# Patient Record
Sex: FEMALE | Race: WHITE | Employment: UNEMPLOYED | URBAN - NONMETROPOLITAN AREA
[De-identification: names, ages, dates, MRNs, and addresses within clinical notes are randomized per-mention and may not be internally consistent; named-entity substitution may affect disease eponyms.]

---

## 2019-03-31 ENCOUNTER — HOSPITAL ENCOUNTER (EMERGENCY)
Facility: OTHER | Age: 44
Discharge: HOME | End: 2019-03-31
Attending: INTERNAL MEDICINE | Admitting: INTERNAL MEDICINE
Payer: COMMERCIAL

## 2019-03-31 VITALS
DIASTOLIC BLOOD PRESSURE: 83 MMHG | RESPIRATION RATE: 10 BRPM | HEART RATE: 74 BPM | HEIGHT: 65 IN | TEMPERATURE: 96 F | OXYGEN SATURATION: 96 % | SYSTOLIC BLOOD PRESSURE: 131 MMHG | BODY MASS INDEX: 30.16 KG/M2 | WEIGHT: 181 LBS

## 2019-03-31 DIAGNOSIS — E78.2 MIXED HYPERLIPIDEMIA: ICD-10-CM

## 2019-03-31 DIAGNOSIS — R82.71 BACTERIA IN URINE: ICD-10-CM

## 2019-03-31 DIAGNOSIS — I10 ESSENTIAL HYPERTENSION: ICD-10-CM

## 2019-03-31 LAB
ALBUMIN SERPL-MCNC: 3.6 G/DL (ref 3.5–5.7)
ALBUMIN UR-MCNC: NEGATIVE MG/DL
ALP SERPL-CCNC: 93 U/L (ref 34–104)
ALT SERPL W P-5'-P-CCNC: 9 U/L (ref 7–52)
ANION GAP SERPL CALCULATED.3IONS-SCNC: 11 MMOL/L (ref 3–14)
APPEARANCE UR: CLEAR
AST SERPL W P-5'-P-CCNC: 7 U/L (ref 13–39)
BACTERIA #/AREA URNS HPF: ABNORMAL /HPF
BASOPHILS # BLD AUTO: 0.1 10E9/L (ref 0–0.2)
BASOPHILS NFR BLD AUTO: 1.3 %
BILIRUB SERPL-MCNC: 0.5 MG/DL (ref 0.3–1)
BILIRUB UR QL STRIP: NEGATIVE
BUN SERPL-MCNC: 15 MG/DL (ref 7–25)
CALCIUM SERPL-MCNC: 9.1 MG/DL (ref 8.6–10.3)
CHLORIDE SERPL-SCNC: 97 MMOL/L (ref 98–107)
CHOLEST SERPL-MCNC: 196 MG/DL
CO2 SERPL-SCNC: 21 MMOL/L (ref 21–31)
COLOR UR AUTO: YELLOW
CREAT SERPL-MCNC: 0.79 MG/DL (ref 0.6–1.2)
DIFFERENTIAL METHOD BLD: NORMAL
EOSINOPHIL # BLD AUTO: 0.3 10E9/L (ref 0–0.7)
EOSINOPHIL NFR BLD AUTO: 2.7 %
ERYTHROCYTE [DISTWIDTH] IN BLOOD BY AUTOMATED COUNT: 13.4 % (ref 10–15)
GFR SERPL CREATININE-BSD FRML MDRD: 79 ML/MIN/{1.73_M2}
GLUCOSE SERPL-MCNC: 549 MG/DL (ref 70–105)
GLUCOSE UR STRIP-MCNC: >1000 MG/DL
HBA1C MFR BLD: 13.6 % (ref 4–6)
HCT VFR BLD AUTO: 41.9 % (ref 35–47)
HDLC SERPL-MCNC: 22 MG/DL (ref 23–92)
HGB BLD-MCNC: 14.4 G/DL (ref 11.7–15.7)
HGB UR QL STRIP: ABNORMAL
IMM GRANULOCYTES # BLD: 0.1 10E9/L (ref 0–0.4)
IMM GRANULOCYTES NFR BLD: 0.5 %
KETONES UR STRIP-MCNC: ABNORMAL MG/DL
LDLC SERPL CALC-MCNC: ABNORMAL MG/DL
LEUKOCYTE ESTERASE UR QL STRIP: NEGATIVE
LYMPHOCYTES # BLD AUTO: 3 10E9/L (ref 0.8–5.3)
LYMPHOCYTES NFR BLD AUTO: 28.5 %
MCH RBC QN AUTO: 30.1 PG (ref 26.5–33)
MCHC RBC AUTO-ENTMCNC: 34.4 G/DL (ref 31.5–36.5)
MCV RBC AUTO: 88 FL (ref 78–100)
MONOCYTES # BLD AUTO: 0.6 10E9/L (ref 0–1.3)
MONOCYTES NFR BLD AUTO: 6 %
NEUTROPHILS # BLD AUTO: 6.4 10E9/L (ref 1.6–8.3)
NEUTROPHILS NFR BLD AUTO: 61 %
NITRATE UR QL: NEGATIVE
NON-SQ EPI CELLS #/AREA URNS LPF: ABNORMAL /LPF
NONHDLC SERPL-MCNC: 174 MG/DL
PH UR STRIP: 5 PH (ref 5–9)
PLATELET # BLD AUTO: 409 10E9/L (ref 150–450)
POTASSIUM SERPL-SCNC: 4 MMOL/L (ref 3.5–5.1)
PROT SERPL-MCNC: 6.6 G/DL (ref 6.4–8.9)
RBC # BLD AUTO: 4.79 10E12/L (ref 3.8–5.2)
RBC #/AREA URNS AUTO: ABNORMAL /HPF
SODIUM SERPL-SCNC: 129 MMOL/L (ref 134–144)
SOURCE: ABNORMAL
SP GR UR STRIP: 1.01 (ref 1–1.03)
TRIGL SERPL-MCNC: 715 MG/DL
TROPONIN I SERPL-MCNC: <0.03 UG/L (ref 0–0.03)
UROBILINOGEN UR STRIP-ACNC: 0.2 EU/DL (ref 0.2–1)
WBC # BLD AUTO: 10.4 10E9/L (ref 4–11)
WBC #/AREA URNS AUTO: ABNORMAL /HPF

## 2019-03-31 PROCEDURE — 25000128 H RX IP 250 OP 636: Performed by: INTERNAL MEDICINE

## 2019-03-31 PROCEDURE — 93005 ELECTROCARDIOGRAM TRACING: CPT | Performed by: INTERNAL MEDICINE

## 2019-03-31 PROCEDURE — 83036 HEMOGLOBIN GLYCOSYLATED A1C: CPT | Performed by: INTERNAL MEDICINE

## 2019-03-31 PROCEDURE — 87086 URINE CULTURE/COLONY COUNT: CPT | Performed by: INTERNAL MEDICINE

## 2019-03-31 PROCEDURE — 80061 LIPID PANEL: CPT | Performed by: INTERNAL MEDICINE

## 2019-03-31 PROCEDURE — 99284 EMERGENCY DEPT VISIT MOD MDM: CPT | Mod: 25 | Performed by: INTERNAL MEDICINE

## 2019-03-31 PROCEDURE — 36415 COLL VENOUS BLD VENIPUNCTURE: CPT | Performed by: INTERNAL MEDICINE

## 2019-03-31 PROCEDURE — 80053 COMPREHEN METABOLIC PANEL: CPT | Performed by: INTERNAL MEDICINE

## 2019-03-31 PROCEDURE — 99283 EMERGENCY DEPT VISIT LOW MDM: CPT | Mod: Z6 | Performed by: INTERNAL MEDICINE

## 2019-03-31 PROCEDURE — 93010 ELECTROCARDIOGRAM REPORT: CPT | Performed by: INTERNAL MEDICINE

## 2019-03-31 PROCEDURE — 96365 THER/PROPH/DIAG IV INF INIT: CPT | Performed by: INTERNAL MEDICINE

## 2019-03-31 PROCEDURE — 81001 URINALYSIS AUTO W/SCOPE: CPT | Performed by: INTERNAL MEDICINE

## 2019-03-31 PROCEDURE — 96372 THER/PROPH/DIAG INJ SC/IM: CPT | Performed by: INTERNAL MEDICINE

## 2019-03-31 PROCEDURE — 85025 COMPLETE CBC W/AUTO DIFF WBC: CPT | Performed by: INTERNAL MEDICINE

## 2019-03-31 PROCEDURE — 25000131 ZZH RX MED GY IP 250 OP 636 PS 637: Performed by: INTERNAL MEDICINE

## 2019-03-31 PROCEDURE — 84484 ASSAY OF TROPONIN QUANT: CPT | Performed by: INTERNAL MEDICINE

## 2019-03-31 RX ORDER — DEXTROSE MONOHYDRATE 25 G/50ML
25-50 INJECTION, SOLUTION INTRAVENOUS
Status: DISCONTINUED | OUTPATIENT
Start: 2019-03-31 | End: 2019-03-31 | Stop reason: HOSPADM

## 2019-03-31 RX ORDER — NICOTINE POLACRILEX 4 MG
15-30 LOZENGE BUCCAL
Status: DISCONTINUED | OUTPATIENT
Start: 2019-03-31 | End: 2019-03-31 | Stop reason: HOSPADM

## 2019-03-31 RX ORDER — GLIPIZIDE AND METFORMIN HCL 5; 500 MG/1; MG/1
1 TABLET, FILM COATED ORAL
Qty: 60 TABLET | Refills: 2 | Status: SHIPPED | OUTPATIENT
Start: 2019-03-31 | End: 2019-07-03

## 2019-03-31 RX ORDER — ATORVASTATIN CALCIUM 40 MG/1
40 TABLET, FILM COATED ORAL DAILY
Qty: 30 TABLET | Refills: 2 | Status: SHIPPED | OUTPATIENT
Start: 2019-03-31 | End: 2019-07-03

## 2019-03-31 RX ORDER — LISINOPRIL 20 MG/1
20 TABLET ORAL DAILY
COMMUNITY
End: 2019-04-04

## 2019-03-31 RX ORDER — CEFTRIAXONE SODIUM 1 G/50ML
1 INJECTION, SOLUTION INTRAVENOUS ONCE
Status: COMPLETED | OUTPATIENT
Start: 2019-03-31 | End: 2019-03-31

## 2019-03-31 RX ORDER — PRAVASTATIN SODIUM 10 MG
10 TABLET ORAL DAILY
COMMUNITY
End: 2019-03-31

## 2019-03-31 RX ADMIN — SODIUM CHLORIDE 1000 ML: 9 INJECTION, SOLUTION INTRAVENOUS at 13:15

## 2019-03-31 RX ADMIN — CEFTRIAXONE SODIUM 1 G: 1 INJECTION, SOLUTION INTRAVENOUS at 14:28

## 2019-03-31 RX ADMIN — INSULIN HUMAN 10 UNITS: 100 INJECTION, SOLUTION PARENTERAL at 14:05

## 2019-03-31 ASSESSMENT — ENCOUNTER SYMPTOMS
CONFUSION: 0
ABDOMINAL PAIN: 0
CHILLS: 0
SHORTNESS OF BREATH: 0
BACK PAIN: 0
LIGHT-HEADEDNESS: 1
WOUND: 0
BRUISES/BLEEDS EASILY: 0
ADENOPATHY: 0
CHEST TIGHTNESS: 1
HEMATURIA: 0
DIZZINESS: 1
FEVER: 0

## 2019-03-31 ASSESSMENT — MIFFLIN-ST. JEOR: SCORE: 1471.89

## 2019-03-31 NOTE — ED AVS SNAPSHOT
[facility]  [address]  [place] [address]  Phone:  [phone]  Fax:  [phone]                                    Beatriz Leal   MRN: [id number]    Department:  [facility]   Date of Visit:  3/31/2019           After Visit Summary Signature Page    I have received my discharge instructions, and my questions have been answered. I have discussed any challenges I see with this plan with the nurse or doctor.    ..........................................................................................................................................  Patient/Patient Representative Signature      ..........................................................................................................................................  Patient Representative Print Name and Relationship to Patient    ..................................................               ................................................  Date                                   Time    ..........................................................................................................................................  Reviewed by Signature/Title    ...................................................              ..............................................  Date                                               Time          22EPIC Rev 08/18

## 2019-03-31 NOTE — DISCHARGE INSTRUCTIONS
Stop Pravastatin.     Start Lipitor.     Continue Lisinopril.     Start glipizide and metformin.    Start Levemir or other preferred long-acting insulin in the evening.    Schedule clinic follow-up appointment --- at the end of this week with 1 of the family practice providers to review your home blood sugar levels.    Take Keflex for bladder infection.    Blood sugar checks at home  (check 3 times daily, randomly).   - Check before breakfast, before lunch, before supper and before bedtime and record.   - Bring these with you to your next appointment.      --> Goal AM glucose: .      Results for orders placed or performed during the hospital encounter of 03/31/19   CBC with platelets differential   Result Value Ref Range    WBC 10.4 4.0 - 11.0 10e9/L    RBC Count 4.79 3.8 - 5.2 10e12/L    Hemoglobin 14.4 11.7 - 15.7 g/dL    Hematocrit 41.9 35.0 - 47.0 %    MCV 88 78 - 100 fl    MCH 30.1 26.5 - 33.0 pg    MCHC 34.4 31.5 - 36.5 g/dL    RDW 13.4 10.0 - 15.0 %    Platelet Count 409 150 - 450 10e9/L    Diff Method Automated Method     % Neutrophils 61.0 %    % Lymphocytes 28.5 %    % Monocytes 6.0 %    % Eosinophils 2.7 %    % Basophils 1.3 %    % Immature Granulocytes 0.5 %    Absolute Neutrophil 6.4 1.6 - 8.3 10e9/L    Absolute Lymphocytes 3.0 0.8 - 5.3 10e9/L    Absolute Monocytes 0.6 0.0 - 1.3 10e9/L    Absolute Eosinophils 0.3 0.0 - 0.7 10e9/L    Absolute Basophils 0.1 0.0 - 0.2 10e9/L    Abs Immature Granulocytes 0.1 0 - 0.4 10e9/L   Comprehensive metabolic panel   Result Value Ref Range    Sodium 129 (L) 134 - 144 mmol/L    Potassium 4.0 3.5 - 5.1 mmol/L    Chloride 97 (L) 98 - 107 mmol/L    Carbon Dioxide 21 21 - 31 mmol/L    Anion Gap 11 3 - 14 mmol/L    Glucose 549 (HH) 70 - 105 mg/dL    Urea Nitrogen 15 7 - 25 mg/dL    Creatinine 0.79 0.60 - 1.20 mg/dL    GFR Estimate 79 >60 mL/min/[1.73_m2]    GFR Estimate If Black >90 >60 mL/min/[1.73_m2]    Calcium 9.1 8.6 - 10.3 mg/dL    Bilirubin Total 0.5 0.3 - 1.0  mg/dL    Albumin 3.6 3.5 - 5.7 g/dL    Protein Total 6.6 6.4 - 8.9 g/dL    Alkaline Phosphatase 93 34 - 104 U/L    ALT 9 7 - 52 U/L    AST 7 (L) 13 - 39 U/L   Hemoglobin A1c   Result Value Ref Range    Hemoglobin A1C 13.6 (H) 4.0 - 6.0 %   *UA reflex to Microscopic   Result Value Ref Range    Color Urine Yellow     Appearance Urine Clear     Glucose Urine >1000 (A) NEG^Negative mg/dL    Bilirubin Urine Negative NEG^Negative    Ketones Urine Trace (A) NEG^Negative mg/dL    Specific Gravity Urine 1.010 1.000 - 1.030    Blood Urine Large (A) NEG^Negative    pH Urine 5.0 5.0 - 9.0 pH    Protein Albumin Urine Negative NEG^Negative mg/dL    Urobilinogen Urine 0.2 0.2 - 1.0 EU/dL    Nitrite Urine Negative NEG^Negative    Leukocyte Esterase Urine Negative NEG^Negative    Source Midstream Urine    Urine Microscopic   Result Value Ref Range    WBC Urine 0 - 5 OTO5^0 - 5 /HPF    RBC Urine 25-50 (A) OTO2^O - 2 /HPF    Squamous Epithelial /LPF Urine Few FEW^Few /LPF    Bacteria Urine Moderate (A) NEG^Negative /HPF   Lipid profile   Result Value Ref Range    Cholesterol 196 <200 mg/dL    Triglycerides 715 (H) <150 mg/dL    HDL Cholesterol 22 (L) 23 - 92 mg/dL    LDL Cholesterol Calculated  <100 mg/dL     Cannot estimate LDL when triglyceride exceeds 400 mg/dL    Non HDL Cholesterol 174 (H) <130 mg/dL   Troponin I (now)   Result Value Ref Range    Troponin I ES <0.030 0.000 - 0.034 ug/L

## 2019-03-31 NOTE — ED TRIAGE NOTES
Patient arrived to the ER with complaints of high blood sugar that started today  Patient states it was 465 at home.  Patient is not taking any type of insulin for 2 years.  Patient states that she started feeling bad yesterday and has not improved.  Patient states that she was seeing black spots, dizziness and bilateral armpit to waist burning when she would get up and move.  Patient lives at home and now has insurance to cover her medications.  Patient does not have her own meter and was using a friends to check her blood sugar every other day.

## 2019-03-31 NOTE — ED PROVIDER NOTES
History     Chief Complaint   Patient presents with     Hyperglycemia     HPI  Beatriz Leal is a 43 year old female who presents to the emergency room due to high blood sugar levels.  She checked her blood sugar at home today and it was 465.  She uses a friend's meter as she was checking it once daily.    She had no insurance for quite a while and subsequently had no coverage for diabetic medications.    States that she now some insurance and became worried today because she saw some black spots and got dizzy and lightheaded.    States that normally her blood sugar was as high as 140.  She would check it randomly sometimes once a day.    She needs testing supplies and new diabetic medications.    She has no primary care provider in the clinic, we will get her established with 1 of the family practice providers.  She is due for her diabetic exams as well as annual female exams.    Previously on glyburide 5 mg twice a day, metformin 500 mg twice daily.  She was then 1000 mg twice daily but got diarrhea.  States she was taking Lantus 18 units every evening previously.    --States she has not had any diabetic medications for almost 2 years.    Due to unspecified chest tightness, EKG and troponin were added to the lab work.    She thought her diabetic levels were managed quite well, A1c added on today and came back very high -- greater than 13.    Allergies:  No Known Allergies    Problem List:    Patient Active Problem List    Diagnosis Date Noted     Uncontrolled type 2 diabetes mellitus without complication, with long-term current use of insulin (H) 03/31/2019     Priority: Medium     Bacteria in urine 03/31/2019     Priority: Medium     Mixed hyperlipidemia 03/31/2019     Priority: Medium     Essential hypertension 03/31/2019     Priority: Medium        Past Medical History:    No past medical history on file.    Past Surgical History:    No past surgical history on file.    Family History:    No family history  "on file.    Social History:  Marital Status:    Social History     Tobacco Use     Smoking status: Not on file   Substance Use Topics     Alcohol use: Not on file     Drug use: Not on file        Medications:      atorvastatin (LIPITOR) 40 MG tablet   blood glucose (NO BRAND SPECIFIED) lancets standard   blood glucose (NO BRAND SPECIFIED) test strip   blood glucose monitoring (NO BRAND SPECIFIED) meter device kit   cephALEXin (KEFLEX) 250 MG capsule   glipiZIDE-metFORMIN (METAGLIP) 5-500 MG tablet   insulin detemir (LEVEMIR PEN) 100 UNIT/ML pen   insulin pen needle (31G X 6 MM) 31G X 6 MM miscellaneous   lisinopril (PRINIVIL/ZESTRIL) 20 MG tablet         Review of Systems   Constitutional: Negative for chills and fever.        Previously with significant obesity.  Weight was 327 pounds, now down to approximately 180 pounds.   HENT: Negative for congestion.    Eyes: Positive for visual disturbance (saw some black spots this morning when standing up.).   Respiratory: Positive for chest tightness. Negative for shortness of breath.    Cardiovascular: Negative for chest pain.   Gastrointestinal: Negative for abdominal pain.   Genitourinary: Negative for hematuria.   Musculoskeletal: Negative for back pain.   Skin: Negative for rash and wound.   Neurological: Positive for dizziness and light-headedness. Negative for syncope.   Hematological: Negative for adenopathy. Does not bruise/bleed easily.   Psychiatric/Behavioral: Negative for confusion.       Physical Exam   BP: 127/77  Pulse: 130  Heart Rate: 107  Temp: 96  F (35.6  C)  Resp: 20  Height: 165.1 cm (5' 5\")  Weight: 82.1 kg (181 lb)  SpO2: 95 %      Physical Exam   Constitutional: She appears well-developed and well-nourished. No distress.   HENT:   Head: Normocephalic and atraumatic.   Eyes: Conjunctivae are normal. No scleral icterus.   Neck: Neck supple.   Cardiovascular: Normal rate and regular rhythm.   Pulmonary/Chest: Effort normal.   Abdominal: Soft. There " "is no tenderness.   Musculoskeletal: She exhibits no deformity.   Lymphadenopathy:     She has no cervical adenopathy.   Neurological: She is alert.   Skin: Skin is warm and dry. No rash noted. She is not diaphoretic.   Psychiatric: She has a normal mood and affect.     No Known Allergies  Patient Vitals for the past 24 hrs:   BP Temp Temp src Pulse Resp SpO2 Height Weight   03/31/19 1400 128/78 -- -- 82 13 96 % -- --   03/31/19 1330 152/88 -- -- 93 12 97 % -- --   03/31/19 1320 145/82 -- -- 105 12 96 % -- --   03/31/19 1251 127/77 96  F (35.6  C) Temporal 130 20 95 % 1.651 m (5' 5\") 82.1 kg (181 lb)     Orders Placed This Encounter   Procedures     CBC with platelets differential     Comprehensive metabolic panel     Hemoglobin A1c     *UA reflex to Microscopic     Urine Microscopic     Lipid profile     Troponin I (now)     EKG 12 lead     Peripheral IV catheter     Glucose monitor nursing POCT     Assess for hypoglycemia symptoms     Glucose monitor nursing POCT     Glucose monitor nursing POCT     Notify Provider       ED Course     ED Course as of Mar 31 1504   Sun Mar 31, 2019   1419 Labs ordered and IV and IV fluid bolus ordered.Random blood sugar was greater than 500.  10 units of regular insulin ordered subcutaneous.    Urinalysis collected, abnormalities noted, urine culture ordered and pending.Other diabetic based on labs ordered.EKG and troponin ordered.    1421 Rocephin 1 g IV ordered.  For UTI.    1503 Troponin returned negative.  EKG without acute abnormalities.  Patient was feeling better and wished to discharge home.  Advised close follow-up with family practice clinic at the end of the week.Stop pravastatin.  Start Lipitor.  Multiple new diabetic medications.      Procedures          EKG shows sinus rhythm.  Rate 89.  Normal axis.  Possible left atrial enlargement.  Cannot rule out history of septal infarction, age undetermined.  Abnormal EKG.         Results for orders placed or performed during " the hospital encounter of 03/31/19 (from the past 24 hour(s))   CBC with platelets differential   Result Value Ref Range    WBC 10.4 4.0 - 11.0 10e9/L    RBC Count 4.79 3.8 - 5.2 10e12/L    Hemoglobin 14.4 11.7 - 15.7 g/dL    Hematocrit 41.9 35.0 - 47.0 %    MCV 88 78 - 100 fl    MCH 30.1 26.5 - 33.0 pg    MCHC 34.4 31.5 - 36.5 g/dL    RDW 13.4 10.0 - 15.0 %    Platelet Count 409 150 - 450 10e9/L    Diff Method Automated Method     % Neutrophils 61.0 %    % Lymphocytes 28.5 %    % Monocytes 6.0 %    % Eosinophils 2.7 %    % Basophils 1.3 %    % Immature Granulocytes 0.5 %    Absolute Neutrophil 6.4 1.6 - 8.3 10e9/L    Absolute Lymphocytes 3.0 0.8 - 5.3 10e9/L    Absolute Monocytes 0.6 0.0 - 1.3 10e9/L    Absolute Eosinophils 0.3 0.0 - 0.7 10e9/L    Absolute Basophils 0.1 0.0 - 0.2 10e9/L    Abs Immature Granulocytes 0.1 0 - 0.4 10e9/L   Comprehensive metabolic panel   Result Value Ref Range    Sodium 129 (L) 134 - 144 mmol/L    Potassium 4.0 3.5 - 5.1 mmol/L    Chloride 97 (L) 98 - 107 mmol/L    Carbon Dioxide 21 21 - 31 mmol/L    Anion Gap 11 3 - 14 mmol/L    Glucose 549 (HH) 70 - 105 mg/dL    Urea Nitrogen 15 7 - 25 mg/dL    Creatinine 0.79 0.60 - 1.20 mg/dL    GFR Estimate 79 >60 mL/min/[1.73_m2]    GFR Estimate If Black >90 >60 mL/min/[1.73_m2]    Calcium 9.1 8.6 - 10.3 mg/dL    Bilirubin Total 0.5 0.3 - 1.0 mg/dL    Albumin 3.6 3.5 - 5.7 g/dL    Protein Total 6.6 6.4 - 8.9 g/dL    Alkaline Phosphatase 93 34 - 104 U/L    ALT 9 7 - 52 U/L    AST 7 (L) 13 - 39 U/L   Hemoglobin A1c   Result Value Ref Range    Hemoglobin A1C 13.6 (H) 4.0 - 6.0 %   Lipid profile   Result Value Ref Range    Cholesterol 196 <200 mg/dL    Triglycerides 715 (H) <150 mg/dL    HDL Cholesterol 22 (L) 23 - 92 mg/dL    LDL Cholesterol Calculated  <100 mg/dL     Cannot estimate LDL when triglyceride exceeds 400 mg/dL    Non HDL Cholesterol 174 (H) <130 mg/dL   Troponin I (now)   Result Value Ref Range    Troponin I ES <0.030 0.000 - 0.034 ug/L    *UA reflex to Microscopic   Result Value Ref Range    Color Urine Yellow     Appearance Urine Clear     Glucose Urine >1000 (A) NEG^Negative mg/dL    Bilirubin Urine Negative NEG^Negative    Ketones Urine Trace (A) NEG^Negative mg/dL    Specific Gravity Urine 1.010 1.000 - 1.030    Blood Urine Large (A) NEG^Negative    pH Urine 5.0 5.0 - 9.0 pH    Protein Albumin Urine Negative NEG^Negative mg/dL    Urobilinogen Urine 0.2 0.2 - 1.0 EU/dL    Nitrite Urine Negative NEG^Negative    Leukocyte Esterase Urine Negative NEG^Negative    Source Midstream Urine    Urine Microscopic   Result Value Ref Range    WBC Urine 0 - 5 OTO5^0 - 5 /HPF    RBC Urine 25-50 (A) OTO2^O - 2 /HPF    Squamous Epithelial /LPF Urine Few FEW^Few /LPF    Bacteria Urine Moderate (A) NEG^Negative /HPF       Medications   glucose gel 15-30 g (not administered)     Or   dextrose 50 % injection 25-50 mL (not administered)     Or   glucagon injection 1 mg (not administered)   0.9% sodium chloride BOLUS (1,000 mLs Intravenous New Bag 3/31/19 1315)   insulin regular (HumuLIN R/NovoLIN R VIAL) injection 10 Units (10 Units Subcutaneous Given 3/31/19 1405)   cefTRIAXone in d5w (ROCEPHIN) intermittent infusion 1 g (1 g Intravenous New Bag 3/31/19 1428)       Assessments & Plan (with Medical Decision Making)     I have reviewed the nursing notes.    I have reviewed the findings, diagnosis, plan and need for follow up with the patient.  New testing supplies, meter and kit, lancets were sent to pharmacy.  Previously on glyburide, we will switch to glipizide/metformin, Levemir versus basaglar.   Start Lipitor, stop pravastatin.    --- With metformin use, should probably be on B complex or B12 supplement.  This can be addressed in outpatient clinic.      Medication List      Started    atorvastatin 40 MG tablet  Commonly known as:  LIPITOR  40 mg, Oral, DAILY     blood glucose lancets standard  Commonly known as:  NO BRAND SPECIFIED  Use to test blood sugar 3  times daily or as directed. - Uncontrolled DM2 on insulin     blood glucose monitoring meter device kit  Commonly known as:  NO BRAND SPECIFIED  Use to test blood sugar 3 times daily or as directed. - Uncontrolled DM2 on insulin     blood glucose test strip  Commonly known as:  NO BRAND SPECIFIED  Use to test blood sugar 3 times daily or as directed. - Uncontrolled DM2 on insulin     cephALEXin 250 MG capsule  Commonly known as:  KEFLEX  250 mg, Oral, 4 TIMES DAILY, -- for UTI     glipiZIDE-metFORMIN 5-500 MG tablet  Commonly known as:  METAGLIP  1 tablet, Oral, 2 TIMES DAILY BEFORE MEALS     insulin detemir 100 UNIT/ML pen  Commonly known as:  LEVEMIR PEN  20 Units, Subcutaneous, AT BEDTIME, --Okay to use Levemir or substitute for Basaglar --  whichever is preferred by insurance     insulin pen needle 31G X 6 MM miscellaneous  Commonly known as:  31G X 6 MM  Use 1 pen needles daily or as directed.        Discontinued    pravastatin 10 MG tablet  Commonly known as:  PRAVACHOL            Final diagnoses:   Uncontrolled type 2 diabetes mellitus without complication, with long-term current use of insulin (H)   Bacteria in urine   Mixed hyperlipidemia   Essential hypertension       3/31/2019   [facility]     Min Wong MD  03/31/19 6022

## 2019-04-01 LAB
BACTERIA SPEC CULT: NORMAL
SPECIMEN SOURCE: NORMAL

## 2019-04-01 NOTE — RESULT ENCOUNTER NOTE
Final urine culture report is NEGATIVE per [place] [facility] Result protocol.    If NEGATIVE result, no change in treatment, per [place] [facility] Result protocol.

## 2019-04-04 ENCOUNTER — OFFICE VISIT (OUTPATIENT)
Dept: FAMILY MEDICINE | Facility: OTHER | Age: 44
End: 2019-04-04
Attending: NURSE PRACTITIONER
Payer: COMMERCIAL

## 2019-04-04 VITALS
WEIGHT: 185.25 LBS | SYSTOLIC BLOOD PRESSURE: 118 MMHG | BODY MASS INDEX: 31.63 KG/M2 | DIASTOLIC BLOOD PRESSURE: 64 MMHG | TEMPERATURE: 97.9 F | RESPIRATION RATE: 20 BRPM | HEIGHT: 64 IN | HEART RATE: 72 BPM

## 2019-04-04 DIAGNOSIS — J45.20 MILD INTERMITTENT ASTHMA WITHOUT COMPLICATION: ICD-10-CM

## 2019-04-04 DIAGNOSIS — B37.31 YEAST INFECTION OF THE VAGINA: ICD-10-CM

## 2019-04-04 DIAGNOSIS — E11.65 UNCONTROLLED TYPE 2 DIABETES MELLITUS WITH HYPERGLYCEMIA (H): Primary | ICD-10-CM

## 2019-04-04 DIAGNOSIS — F31.9 BIPOLAR DEPRESSION (H): ICD-10-CM

## 2019-04-04 PROBLEM — F32.A ANXIETY AND DEPRESSION: Status: ACTIVE | Noted: 2017-02-03

## 2019-04-04 PROBLEM — F41.9 ANXIETY AND DEPRESSION: Status: ACTIVE | Noted: 2017-02-03

## 2019-04-04 PROBLEM — M25.551 RIGHT HIP PAIN: Status: ACTIVE | Noted: 2017-02-03

## 2019-04-04 PROBLEM — J45.30 MILD PERSISTENT ASTHMA WITHOUT COMPLICATION: Status: ACTIVE | Noted: 2017-02-03

## 2019-04-04 PROBLEM — G43.909 MIGRAINE HEADACHE: Status: ACTIVE | Noted: 2017-02-03

## 2019-04-04 PROCEDURE — 99214 OFFICE O/P EST MOD 30 MIN: CPT | Performed by: NURSE PRACTITIONER

## 2019-04-04 PROCEDURE — G0463 HOSPITAL OUTPT CLINIC VISIT: HCPCS

## 2019-04-04 RX ORDER — FLUOXETINE 40 MG/1
40 CAPSULE ORAL DAILY
COMMUNITY
Start: 2017-02-03 | End: 2019-04-04

## 2019-04-04 RX ORDER — LISINOPRIL 20 MG/1
10 TABLET ORAL DAILY
Qty: 30 TABLET | Refills: 0 | Status: SHIPPED | OUTPATIENT
Start: 2019-04-04 | End: 2019-07-03

## 2019-04-04 RX ORDER — ALBUTEROL SULFATE 90 UG/1
2 AEROSOL, METERED RESPIRATORY (INHALATION) 4 TIMES DAILY PRN
Qty: 8.5 G | Refills: 11 | Status: SHIPPED | OUTPATIENT
Start: 2019-04-04

## 2019-04-04 RX ORDER — ALBUTEROL SULFATE 90 UG/1
2 AEROSOL, METERED RESPIRATORY (INHALATION) 4 TIMES DAILY PRN
COMMUNITY
Start: 2017-02-03 | End: 2019-04-04

## 2019-04-04 RX ORDER — FLUCONAZOLE 150 MG/1
150 TABLET ORAL ONCE
Qty: 1 TABLET | Refills: 0 | Status: SHIPPED | OUTPATIENT
Start: 2019-04-04 | End: 2019-04-25

## 2019-04-04 SDOH — HEALTH STABILITY: MENTAL HEALTH: HOW OFTEN DO YOU HAVE A DRINK CONTAINING ALCOHOL?: NEVER

## 2019-04-04 ASSESSMENT — MIFFLIN-ST. JEOR: SCORE: 1467.35

## 2019-04-04 ASSESSMENT — PAIN SCALES - GENERAL: PAINLEVEL: NO PAIN (0)

## 2019-04-04 ASSESSMENT — PATIENT HEALTH QUESTIONNAIRE - PHQ9: SUM OF ALL RESPONSES TO PHQ QUESTIONS 1-9: 9

## 2019-04-04 NOTE — PROGRESS NOTES
SUBJECTIVE  Beatriz Leal is a 43 year old female who presents to the clinic today for a recheck of Type II diabetes. Last recheck was 2 year ago.  She reports the following recent symptoms: fatigue, hyperglycemia, insomnia, malaise, nocturia, polydipsia and vision changes.  The patient denies any of the following symptoms: diarrhea, dysuria, hypoglycemia, polyphagia and polyuria.  Last eye exam was 3 years ago. Patient's weight has been stable recently.         General range of recent home AM fasting blood sugars:  216-340  General range of recent home noon blood sugars: 216-240  General range of recent home PM blood sugars:  200-250  General range of recent home nighttime blood sugars:  287 last night, only value for this time frame         Last clinic fasting glucoses:    Lab Results   Component Value Date     03/31/2019       Most recent hemoglobin A1c's:    Lab Results   Component Value Date    A1C 13.6 03/31/2019         Last urine for albumin:   No results found for: MICROL       Last BUN:   Lab Results   Component Value Date    BUN 15 03/31/2019         Last Creatinine:  Lab Results   Component Value Date    CR 0.79 03/31/2019     Lipids:  Recent Labs   Lab Test 03/31/19  1319   CHOL 196   HDL 22*   LDL Cannot estimate LDL when triglyceride exceeds 400 mg/dL   TRIG 715*        Patient Active Problem List   Diagnosis     Uncontrolled type 2 diabetes mellitus without complication, with long-term current use of insulin (H)     Bacteria in urine     Mixed hyperlipidemia     Essential hypertension     Anxiety and depression     Migraine headache     Mild persistent asthma without complication     Right hip pain     Vaginal yeast infection       Current Outpatient Medications   Medication Sig Dispense Refill     blood glucose (NO BRAND SPECIFIED) lancets standard Use to test blood sugar 3 times daily or as directed. - Uncontrolled DM2 on insulin 100 each 11     blood glucose (NO BRAND SPECIFIED) test  "strip Use to test blood sugar 3 times daily or as directed. - Uncontrolled DM2 on insulin 100 each 11     blood glucose monitoring (NO BRAND SPECIFIED) meter device kit Use to test blood sugar 3 times daily or as directed. - Uncontrolled DM2 on insulin 1 kit 1     cephALEXin (KEFLEX) 250 MG capsule Take 1 capsule (250 mg) by mouth 4 times daily for 7 days -- for UTI 28 capsule 0     insulin detemir (LEVEMIR PEN) 100 UNIT/ML pen Inject 20 Units Subcutaneous At Bedtime --Okay to use Levemir or substitute for Basaglar --  whichever is preferred by insurance 15 mL 2     insulin pen needle (31G X 6 MM) 31G X 6 MM miscellaneous Use 1 pen needles daily or as directed. 100 each 3     albuterol (PROAIR HFA) 108 (90 Base) MCG/ACT inhaler Inhale 2 puffs into the lungs 4 times daily as needed       atorvastatin (LIPITOR) 40 MG tablet Take 1 tablet (40 mg) by mouth daily (Patient not taking: Reported on 4/4/2019) 30 tablet 2     FLUoxetine (PROZAC) 40 MG capsule Take 40 mg by mouth daily       glipiZIDE-metFORMIN (METAGLIP) 5-500 MG tablet Take 1 tablet by mouth 2 times daily (before meals) (Patient not taking: Reported on 4/4/2019) 60 tablet 2     lisinopril (PRINIVIL/ZESTRIL) 20 MG tablet Take 20 mg by mouth daily           Social History     Tobacco Use     Smoking status: Current Every Day Smoker     Packs/day: 1.00     Years: 30.00     Pack years: 30.00     Types: Cigarettes     Smokeless tobacco: Never Used   Substance Use Topics     Alcohol use: No     Frequency: Never     Drug use: Yes     Types: Marijuana     Comment: usually at night to help sleep     OBJECTIVE:  EXAM:  Appears comfortable today, no apparent distress  VITALS: /64   Pulse 72   Temp 97.9  F (36.6  C) (Tympanic)   Resp 20   Ht 1.613 m (5' 3.5\")   Wt 84 kg (185 lb 4 oz)   LMP 03/30/2019   BMI 32.30 kg/m    FUNDI: normal and non-dilated exam  RESP: Normal - Clear to auscultation without rales, rhonchi, or wheezing.  CARDIAC: NORMAL - regular " rate and rhythm without murmur.  EXTREMITIES: Good peripheral pulses, no ulcerations, normal DTRs and sensation to light touch         ASSESSMENT:  Type II Diabetes, Markedly worsening.         PLAN:  Aspirin initiated today  Encouraged to quit smoking  Exercise recommended  Eye exam by Ophthalmology recommended  Insulin increased  Referral to diabetic education  Follow up in 1 month.      SUBJECTIVE:   Beatriz Leal is a 43 year old female who presents to clinic today for the following health issues:    Diabetes Follow-up      Patient is checking blood sugars: now 3 times per day (since Monday)    Diabetic concerns: blood sugar frequently over 200     Symptoms of hypoglycemia (low blood sugar): none     Paresthesias (numbness or burning in feet) or sores: Yes burning feeling     Date of last diabetic eye exam: 3 years ago    Diabetes Management Resources    Hyperlipidemia Follow-Up      Rate your low fat/cholesterol diet?: not monitoring fat    Taking statin?  Yes, recently switched to atorvastatin    Other lipid medications/supplements?:  none    Hypertension Follow-up      Outpatient blood pressures are not being checked.    Low Salt Diet: not monitoring salt    BP Readings from Last 2 Encounters:   04/04/19 118/64   03/31/19 131/83     Hemoglobin A1C (%)   Date Value   03/31/2019 13.6 (H)     LDL Cholesterol Calculated (mg/dL)   Date Value   03/31/2019     Cannot estimate LDL when triglyceride exceeds 400 mg/dL     Depression Followup    Status since last visit: Worsened a fair amount, has been out of prozac for about 6 months due to loss of health coverage    See PHQ-9 for current symptoms.  Other associated symptoms: None    Complicating factors:   Significant life event:  No   Current substance abuse:  Cannabis  Anxiety or Panic symptoms:  No    PHQ 4/4/2019   PHQ-9 Total Score 9   Q9: Thoughts of better off dead/self-harm past 2 weeks Not at all     In the past two weeks have you had thoughts of suicide or  self-harm?  No.    Do you have concerns about your personal safety or the safety of others?   No  PHQ-9  English  PHQ-9   Any Language  Suicide Assessment Five-step Evaluation and Treatment (SAFE-T)  Asthma Follow-Up    Was ACT completed today?    Yes    ACT Total Scores 4/4/2019   ACT TOTAL SCORE (Goal Greater than or Equal to 20) 21   In the past 12 months, how many times did you visit the emergency room for your asthma without being admitted to the hospital? 0   In the past 12 months, how many times were you hospitalized overnight because of your asthma? 0       Recent asthma triggers that patient is dealing with: None        Amount of exercise or physical activity: None    Problems taking medications regularly: none now, had no health insurance for a while    Medication side effects: none    Diet: regular (no restrictions)    Vaginal Symptoms  Onset: Tuesday/Wednesday    Description:  Vaginal Discharge: white curd-like   Itching (Pruritis): YES  Burning sensation:  YES  Odor: no     Accompanying Signs & Symptoms:  Pain with Urination: no   Abdominal Pain: no   Fever: no     History:   Sexually active: YES  New Partner: no   Possibility of Pregnancy:  No    Precipitating factors:   Recent Antibiotic Use: YES- had rocephin in the ER and finishing keflex for UTI    Alleviating factors:  nothing    Therapies Tried and outcome: nothing    Problem list and histories reviewed & adjusted, as indicated.  Additional history: as documented    Patient Active Problem List   Diagnosis     Uncontrolled type 2 diabetes mellitus without complication, with long-term current use of insulin (H)     Bacteria in urine     Mixed hyperlipidemia     Essential hypertension     Anxiety and depression     Migraine headache     Mild persistent asthma without complication     Right hip pain     Vaginal yeast infection     Past Surgical History:   Procedure Laterality Date     ceserean section      x2     CHOLECYSTECTOMY       TUBAL LIGATION          Social History     Tobacco Use     Smoking status: Current Every Day Smoker     Packs/day: 1.00     Years: 30.00     Pack years: 30.00     Types: Cigarettes     Smokeless tobacco: Never Used   Substance Use Topics     Alcohol use: No     Frequency: Never     Family History   Problem Relation Age of Onset     Ovarian Cancer Mother      Emphysema Mother      Diabetes Father      Pancreatic Cancer Father      Diabetes Maternal Grandmother      Melanoma Brother      Diabetes Sister      Attention Deficit Disorder Son      ODD Son      Diabetes Sister      Heart Disease Sister      Coronary Artery Disease Sister      Asthma Sister      Coronary Artery Disease Sister      Autism Spectrum Disorder Son          Current Outpatient Medications   Medication Sig Dispense Refill     albuterol (PROAIR HFA) 108 (90 Base) MCG/ACT inhaler Inhale 2 puffs into the lungs 4 times daily as needed 8.5 g 11     blood glucose (NO BRAND SPECIFIED) lancets standard Use to test blood sugar 3 times daily or as directed. - Uncontrolled DM2 on insulin 100 each 11     blood glucose (NO BRAND SPECIFIED) test strip Use to test blood sugar 3 times daily or as directed. - Uncontrolled DM2 on insulin 100 each 11     blood glucose monitoring (NO BRAND SPECIFIED) meter device kit Use to test blood sugar 3 times daily or as directed. - Uncontrolled DM2 on insulin 1 kit 1     cephALEXin (KEFLEX) 250 MG capsule Take 1 capsule (250 mg) by mouth 4 times daily for 7 days -- for UTI 28 capsule 0     fluconazole (DIFLUCAN) 150 MG tablet Take 1 tablet (150 mg) by mouth once for 1 dose 1 tablet 0     FLUoxetine (PROZAC) 20 MG capsule Take 1 capsule (20 mg) by mouth daily 60 capsule 0     insulin detemir (LEVEMIR PEN) 100 UNIT/ML pen Inject 20 Units Subcutaneous At Bedtime --Okay to use Levemir or substitute for Basaglar --  whichever is preferred by insurance 15 mL 2     insulin pen needle (31G X 6 MM) 31G X 6 MM miscellaneous Use 1 pen needles daily or as  "directed. 100 each 3     lisinopril (PRINIVIL/ZESTRIL) 20 MG tablet Take 0.5 tablets (10 mg) by mouth daily 30 tablet 0     atorvastatin (LIPITOR) 40 MG tablet Take 1 tablet (40 mg) by mouth daily (Patient not taking: Reported on 4/4/2019) 30 tablet 2     glipiZIDE-metFORMIN (METAGLIP) 5-500 MG tablet Take 1 tablet by mouth 2 times daily (before meals) (Patient not taking: Reported on 4/4/2019) 60 tablet 2     No Known Allergies    Reviewed and updated as needed this visit by clinical staff  Tobacco  Allergies  Meds  Problems  Med Hx  Surg Hx  Fam Hx  Soc Hx        Reviewed and updated as needed this visit by Provider  Tobacco  Allergies  Meds  Problems  Med Hx  Surg Hx  Fam Hx  Soc Hx          ROS:  CONSTITUTIONAL: NEGATIVE for fever, chills, change in weight  INTEGUMENTARY/SKIN: NEGATIVE for worrisome rashes, moles or lesions  EYES: POSITIVE for black dots in vision last Sunday, prompting ER visit  ENT/MOUTH: NEGATIVE for ear, mouth and throat problems  RESP: NEGATIVE for significant cough or SOB  CV: NEGATIVE for chest pain, palpitations or peripheral edema  GI: NEGATIVE for nausea, abdominal pain, heartburn, or change in bowel habits  : NEGATIVE for frequency, dysuria, or hematuria   female: yeast infection  MUSCULOSKELETAL: NEGATIVE for significant arthralgias or myalgia  NEURO: POSITIVE for burning feeling on soles of feet  ENDOCRINE: NEGATIVE for temperature intolerance, skin/hair changes  HEME: NEGATIVE for bleeding problems  PSYCHIATRIC: NEGATIVE for changes in mood or affect    OBJECTIVE:     /64   Pulse 72   Temp 97.9  F (36.6  C) (Tympanic)   Resp 20   Ht 1.613 m (5' 3.5\")   Wt 84 kg (185 lb 4 oz)   LMP 03/30/2019   BMI 32.30 kg/m    Body mass index is 32.3 kg/m .  GENERAL: alert, no distress, obese and appears older than stated age  EYES: Eyes grossly normal to inspection, PERRL and conjunctivae and sclerae normal  HENT: ear canals and TM's normal, nose and mouth without " ulcers or lesions  NECK: no adenopathy, no asymmetry, masses, or scars and thyroid normal to palpation  RESP: lungs clear to auscultation - no rales, rhonchi or wheezes  CV: regular rate and rhythm, normal S1 S2, no S3 or S4, no murmur, click or rub, no peripheral edema and peripheral pulses strong  ABDOMEN: soft, nontender, no hepatosplenomegaly, no masses and bowel sounds normal   (female): deferred  MS: no gross musculoskeletal defects noted, no edema  SKIN: many scattered areas of abrasion in various staging of healing noted all over body  NEURO: Normal strength and tone, mentation intact and speech normal  PSYCH: mentation appears normal, affect normal/bright    Diagnostic Test Results:  none - recently completed in ER    ASSESSMENT/PLAN:     1. Uncontrolled type 2 diabetes mellitus with hyperglycemia (H)  As above in diabetes note.  - DIABETES EDUCATOR REFERRAL  - lisinopril (PRINIVIL/ZESTRIL) 20 MG tablet; Take 0.5 tablets (10 mg) by mouth daily  Dispense: 30 tablet; Refill: 0    2. Bipolar depression (H)  Had been stable on prozac for many years, has not had for at least the last 6 months. Restarting today, will start with 20mg and adjust as needed.   - FLUoxetine (PROZAC) 20 MG capsule; Take 1 capsule (20 mg) by mouth daily  Dispense: 60 capsule; Refill: 0    3. Mild intermittent asthma without complication  Refilled inhaler. No problems.   - albuterol (PROAIR HFA) 108 (90 Base) MCG/ACT inhaler; Inhale 2 puffs into the lungs 4 times daily as needed  Dispense: 8.5 g; Refill: 11    4. Yeast infection of the vagina  Current yeast infection related to antibiotic usage. Diflucan sent in to pharmacy.   - fluconazole (DIFLUCAN) 150 MG tablet; Take 1 tablet (150 mg) by mouth once for 1 dose  Dispense: 1 tablet; Refill: 0      Calli Anderson NP  [facility]

## 2019-04-04 NOTE — PATIENT INSTRUCTIONS
DIABETES MANAGEMENT: POST-VISIT INSTRUCTIONS    Diabetes management GOALS:  HbA1c less than 6.5, measured at least every six months  BP less than 130/80  annual lipids with goal of LDL less than 100  annual eye exam  annual foot exam  Take aspirin daily    RETURN to clinic:  In 1 months    Medication changes:  Increase levemir by 4units every 3-4 nights until fasting blood sugar is in the normal range. May consider adding fast acting insulin.    Referrals:  CONSULT [facility] DIABETIC EDUCATION    Labs prior to 3 month visit:  HEMOGLOBIN A1C  TSH  LIPIDS  LIVER TEST  CREATININE  URINE TEST

## 2019-04-04 NOTE — LETTER
My Asthma Action Plan  Name: Beatriz Leal   YOB: 1975  Date: 4/4/2019   My doctor: Calli Anderson NP   My clinic: [facility]        My Control Medicine: None  My Rescue Medicine: Albuterol (Proair/Ventolin/Proventil) inhaler PRN   My Asthma Severity: intermittent  Avoid your asthma triggers: upper respiratory infections               GREEN ZONE   Good Control    I feel good    No cough or wheeze    Can work, sleep and play without asthma symptoms       Take your asthma control medicine every day.     1. If exercise triggers your asthma, take your rescue medication    15 minutes before exercise or sports, and    During exercise if you have asthma symptoms  2. Spacer to use with inhaler: If you have a spacer, make sure to use it with your inhaler             YELLOW ZONE Getting Worse  I have ANY of these:    I do not feel good    Cough or wheeze    Chest feels tight    Wake up at night   1. Keep taking your Green Zone medications  2. Start taking your rescue medicine:    every 20 minutes for up to 1 hour. Then every 4 hours for 24-48 hours.  3. If you stay in the Yellow Zone for more than 12-24 hours, contact your doctor.  4. If you do not return to the Green Zone in 12-24 hours or you get worse, start taking your oral steroid medicine if prescribed by your provider.           RED ZONE Medical Alert - Get Help  I have ANY of these:    I feel awful    Medicine is not helping    Breathing getting harder    Trouble walking or talking    Nose opens wide to breathe       1. Take your rescue medicine NOW  2. If your provider has prescribed an oral steroid medicine, start taking it NOW  3. Call your doctor NOW  4. If you are still in the Red Zone after 20 minutes and you have not reached your doctor:    Take your rescue medicine again and    Call 911 or go to the emergency room right away    See your regular doctor within 2 weeks of an Emergency Room or Urgent Care visit for  follow-up treatment.          Annual Reminders:  Meet with Asthma Educator,  Flu Shot in the Fall, consider Pneumonia Vaccination for patients with asthma (aged 19 and older).    Pharmacy: [facility] [address] [place] [address] AT [address]                      Asthma Triggers  How To Control Things That Make Your Asthma Worse    Triggers are things that make your asthma worse.  Look at the list below to help you find your triggers and what you can do about them.  You can help prevent asthma flare-ups by staying away from your triggers.      Trigger                                                          What you can do   Cigarette Smoke  Tobacco smoke can make asthma worse. Do not allow smoking in your home, car or around you.  Be sure no one smokes at a child s day care or school.  If you smoke, ask your health care provider for ways to help you quit.  Ask family members to quit too.  Ask your health care provider for a referral to Quit Plan to help you quit smoking, or call 7-625-460-PLAN.     Colds, Flu, Bronchitis  These are common triggers of asthma. Wash your hands often.  Don t touch your eyes, nose or mouth.  Get a flu shot every year.     Dust Mites  These are tiny bugs that live in cloth or carpet. They are too small to see. Wash sheets and blankets in hot water every week.   Encase pillows and mattress in dust mite proof covers.  Avoid having carpet if you can. If you have carpet, vacuum weekly.   Use a dust mask and HEPA vacuum.   Pollen and Outdoor Mold  Some people are allergic to trees, grass, or weed pollen, or molds. Try to keep your windows closed.  Limit time out doors when pollen count is high.   Ask you health care provider about taking medicine during allergy season.     Animal Dander  Some people are allergic to skin flakes, urine or saliva from pets with fur or feathers. Keep pets with fur or feathers out of your home.    If you can t keep the pet  outdoors, then keep the pet out of your bedroom.  Keep the bedroom door closed.  Keep pets off cloth furniture and away from stuffed toys.     Mice, Rats, and Cockroaches  Some people are allergic to the waste from these pests.   Cover food and garbage.  Clean up spills and food crumbs.  Store grease in the refrigerator.   Keep food out of the bedroom.   Indoor Mold  This can be a trigger if your home has high moisture. Fix leaking faucets, pipes, or other sources of water.   Clean moldy surfaces.  Dehumidify basement if it is damp and smelly.   Smoke, Strong Odors, and Sprays  These can reduce air quality. Stay away from strong odors and sprays, such as perfume, powder, hair spray, paints, smoke incense, paint, cleaning products, candles and new carpet.   Exercise or Sports  Some people with asthma have this trigger. Be active!  Ask your doctor about taking medicine before sports or exercise to prevent symptoms.    Warm up for 5-10 minutes before and after sports or exercise.     Other Triggers of Asthma  Cold air:  Cover your nose and mouth with a scarf.  Sometimes laughing or crying can be a trigger.  Some medicines and food can trigger asthma.

## 2019-04-04 NOTE — LETTER
To whom it may concern:     In my professional opinion, it is beneficial for Beatriz to have her cat remain in the apartment with her for mental health reasons.     Sincerely,        Calli Anderson, CNP

## 2019-04-04 NOTE — LETTER
My Asthma Action Plan  Name: Beatriz Leal   YOB: 1975  Date: 4/4/2019   My doctor: Calli Anderson NP   My clinic: [facility]        My Control Medicine: None  My Rescue Medicine: Albuterol (Proair/Ventolin/Proventil) inhaler PRN   My Asthma Severity: intermittent  Avoid your asthma triggers: upper respiratory infections               GREEN ZONE   Good Control    I feel good    No cough or wheeze    Can work, sleep and play without asthma symptoms       Take your asthma control medicine every day.     1. If exercise triggers your asthma, take your rescue medication    15 minutes before exercise or sports, and    During exercise if you have asthma symptoms  2. Spacer to use with inhaler: If you have a spacer, make sure to use it with your inhaler             YELLOW ZONE Getting Worse  I have ANY of these:    I do not feel good    Cough or wheeze    Chest feels tight    Wake up at night   1. Keep taking your Green Zone medications  2. Start taking your rescue medicine:    every 20 minutes for up to 1 hour. Then every 4 hours for 24-48 hours.  3. If you stay in the Yellow Zone for more than 12-24 hours, contact your doctor.  4. If you do not return to the Green Zone in 12-24 hours or you get worse, start taking your oral steroid medicine if prescribed by your provider.           RED ZONE Medical Alert - Get Help  I have ANY of these:    I feel awful    Medicine is not helping    Breathing getting harder    Trouble walking or talking    Nose opens wide to breathe       1. Take your rescue medicine NOW  2. If your provider has prescribed an oral steroid medicine, start taking it NOW  3. Call your doctor NOW  4. If you are still in the Red Zone after 20 minutes and you have not reached your doctor:    Take your rescue medicine again and    Call 911 or go to the emergency room right away    See your regular doctor within 2 weeks of an Emergency Room or Urgent Care visit for  follow-up treatment.          Annual Reminders:  Meet with Asthma Educator,  Flu Shot in the Fall, consider Pneumonia Vaccination for patients with asthma (aged 19 and older).    Pharmacy: [facility] [address] [place] [address] AT [address]                      Asthma Triggers  How To Control Things That Make Your Asthma Worse    Triggers are things that make your asthma worse.  Look at the list below to help you find your triggers and what you can do about them.  You can help prevent asthma flare-ups by staying away from your triggers.      Trigger                                                          What you can do   Cigarette Smoke  Tobacco smoke can make asthma worse. Do not allow smoking in your home, car or around you.  Be sure no one smokes at a child s day care or school.  If you smoke, ask your health care provider for ways to help you quit.  Ask family members to quit too.  Ask your health care provider for a referral to Quit Plan to help you quit smoking, or call 1-024-574-PLAN.     Colds, Flu, Bronchitis  These are common triggers of asthma. Wash your hands often.  Don t touch your eyes, nose or mouth.  Get a flu shot every year.     Dust Mites  These are tiny bugs that live in cloth or carpet. They are too small to see. Wash sheets and blankets in hot water every week.   Encase pillows and mattress in dust mite proof covers.  Avoid having carpet if you can. If you have carpet, vacuum weekly.   Use a dust mask and HEPA vacuum.   Pollen and Outdoor Mold  Some people are allergic to trees, grass, or weed pollen, or molds. Try to keep your windows closed.  Limit time out doors when pollen count is high.   Ask you health care provider about taking medicine during allergy season.     Animal Dander  Some people are allergic to skin flakes, urine or saliva from pets with fur or feathers. Keep pets with fur or feathers out of your home.    If you can t keep the pet  outdoors, then keep the pet out of your bedroom.  Keep the bedroom door closed.  Keep pets off cloth furniture and away from stuffed toys.     Mice, Rats, and Cockroaches  Some people are allergic to the waste from these pests.   Cover food and garbage.  Clean up spills and food crumbs.  Store grease in the refrigerator.   Keep food out of the bedroom.   Indoor Mold  This can be a trigger if your home has high moisture. Fix leaking faucets, pipes, or other sources of water.   Clean moldy surfaces.  Dehumidify basement if it is damp and smelly.   Smoke, Strong Odors, and Sprays  These can reduce air quality. Stay away from strong odors and sprays, such as perfume, powder, hair spray, paints, smoke incense, paint, cleaning products, candles and new carpet.   Exercise or Sports  Some people with asthma have this trigger. Be active!  Ask your doctor about taking medicine before sports or exercise to prevent symptoms.    Warm up for 5-10 minutes before and after sports or exercise.     Other Triggers of Asthma  Cold air:  Cover your nose and mouth with a scarf.  Sometimes laughing or crying can be a trigger.  Some medicines and food can trigger asthma.

## 2019-04-04 NOTE — NURSING NOTE
"Chief Complaint   Patient presents with     RECHECK     ER visit 3/31/19 for high blood sugars     She hasn't been taking some meds, needs Prozac and lisinopril refilled. And another diabetic check appt in 3 months.    Initial /64   Pulse 72   Temp 97.9  F (36.6  C) (Tympanic)   Resp 20   Ht 1.613 m (5' 3.5\")   Wt 84 kg (185 lb 4 oz)   LMP 03/30/2019   BMI 32.30 kg/m   Estimated body mass index is 32.3 kg/m  as calculated from the following:    Height as of this encounter: 1.613 m (5' 3.5\").    Weight as of this encounter: 84 kg (185 lb 4 oz).    Medication Reconciliation: complete      Norma J. Gosselin, LPN  "

## 2019-04-05 ASSESSMENT — ASTHMA QUESTIONNAIRES: ACT_TOTALSCORE: 21

## 2019-04-25 ENCOUNTER — TELEPHONE (OUTPATIENT)
Dept: FAMILY MEDICINE | Facility: OTHER | Age: 44
End: 2019-04-25

## 2019-04-25 ENCOUNTER — OFFICE VISIT (OUTPATIENT)
Dept: FAMILY MEDICINE | Facility: OTHER | Age: 44
End: 2019-04-25
Attending: NURSE PRACTITIONER
Payer: COMMERCIAL

## 2019-04-25 VITALS
SYSTOLIC BLOOD PRESSURE: 144 MMHG | DIASTOLIC BLOOD PRESSURE: 76 MMHG | RESPIRATION RATE: 20 BRPM | TEMPERATURE: 97.4 F | OXYGEN SATURATION: 98 % | HEART RATE: 74 BPM | HEIGHT: 64 IN | WEIGHT: 185 LBS | BODY MASS INDEX: 31.58 KG/M2

## 2019-04-25 DIAGNOSIS — H53.8 BLURRED VISION: Primary | ICD-10-CM

## 2019-04-25 DIAGNOSIS — F31.9 BIPOLAR DEPRESSION (H): ICD-10-CM

## 2019-04-25 PROCEDURE — G0463 HOSPITAL OUTPT CLINIC VISIT: HCPCS

## 2019-04-25 PROCEDURE — 99213 OFFICE O/P EST LOW 20 MIN: CPT | Performed by: NURSE PRACTITIONER

## 2019-04-25 PROCEDURE — G0463 HOSPITAL OUTPT CLINIC VISIT: HCPCS | Mod: 25

## 2019-04-25 RX ORDER — INSULIN GLARGINE 100 [IU]/ML
25 INJECTION, SOLUTION SUBCUTANEOUS AT BEDTIME
Qty: 3 ML | Refills: 11 | Status: SHIPPED | OUTPATIENT
Start: 2019-04-25 | End: 2019-07-03

## 2019-04-25 ASSESSMENT — PATIENT HEALTH QUESTIONNAIRE - PHQ9: SUM OF ALL RESPONSES TO PHQ QUESTIONS 1-9: 9

## 2019-04-25 ASSESSMENT — MIFFLIN-ST. JEOR: SCORE: 1466.21

## 2019-04-25 ASSESSMENT — PAIN SCALES - GENERAL: PAINLEVEL: NO PAIN (0)

## 2019-04-25 NOTE — PROGRESS NOTES
SUBJECTIVE:   Beatriz Leal is a 43 year old female who presents to clinic today for the following   health issues:    Diabetes Follow-up    Patient is checking blood sugars: 2-4x daily.    Blood sugar testing frequency justification: Uncontrolled diabetes, Adjustment of medication(s) and Patient modifying lifestyle changes (diet, exercise) with blood sugars  Results are as follows:         am - varies greatly from 120-300+         Has not been consistent with timing of BS checks in conjunction with food intake.    Diabetic concerns: blood sugar frequently over 200, blurry vision     Symptoms of hypoglycemia (low blood sugar): dizzy     Paresthesias (numbness or burning in feet) or sores: Yes burning      Date of last diabetic eye exam: 3 years ago, has not yet made appt for ophthamalogy as previously instructed    BP Readings from Last 2 Encounters:   04/25/19 150/90   04/04/19 118/64     Hemoglobin A1C (%)   Date Value   03/31/2019 13.6 (H)     LDL Cholesterol Calculated (mg/dL)   Date Value   03/31/2019     Cannot estimate LDL when triglyceride exceeds 400 mg/dL       Diabetes Management Resources      Amount of exercise or physical activity: 7 days, 20-30 minutes    Problems taking medications regularly: No    Medication side effects: headaches, increased vision changes (excessively blurry)    Diet: diabetic    Eye(s) Problem  Onset: about the last 2 weeks    Description:   Location: bilateral  Pain: YES- sometimes dry  Redness: no    Accompanying Signs & Symptoms:  Discharge/mattering: no  Swelling: no  Visual changes: YES- blurry  Fever: no  Nasal Congestion: no  Bothered by bright lights: YES    History:   Trauma: no   Foreign body exposure: no    Precipitating factors:   Wearing contacts: no    Alleviating factors:  Improved by: nothing    Therapies Tried and outcome: dark rooms, execedrin migraines, motrin, aspirin    Headache  Onset: about 2 weeks    Description:   Location: bilateral in the frontal  area   Character: throbbing pain, dull pain  Frequency:  constant  Duration:  2 weeks    Intensity: moderate, severe    Progression of Symptoms:  same and waxing and waning    Accompanying Signs & Symptoms:  Stiff neck: no  Neck or upper back pain: no  Fever: no  Sinus pressure: no  Nausea or vomiting: YES- nauseated approx 3-4 times in the last 2 weeks  Dizziness: YES- sometimes  Numbness: no  Weakness: no  Visual changes: YES- as above    History:   Head trauma: no  Family history of migraines: YES- sister  Previous tests for headaches: no  Neurologist evaluations: no  Able to do daily activities: YES- some times  Wake with a headaches: YES  Do headaches wake you up: YES- some times  Daily pain medication use: YES- execedrin migraine, motrin, aspirin  Work/school stressors/changes: no    Precipitating factors:   Does light make it worse: YES  Does sound make it worse: no    Alleviating factors:  Does sleep help: no    Therapies Tried and outcome: Ibuprofen (Advil, Motrin), Tylenol and Excedrin     Additional history: as documented    Reviewed  and updated as needed this visit by clinical staff  Tobacco  Allergies  Meds  Med Hx  Surg Hx  Fam Hx  Soc Hx        Reviewed and updated as needed this visit by Provider         Patient Active Problem List   Diagnosis     Uncontrolled type 2 diabetes mellitus without complication, with long-term current use of insulin (H)     Bacteria in urine     Mixed hyperlipidemia     Essential hypertension     Anxiety and depression     Migraine headache     Mild persistent asthma without complication     Right hip pain     Vaginal yeast infection     Past Surgical History:   Procedure Laterality Date     ceserean section      x2     CHOLECYSTECTOMY       TUBAL LIGATION         Social History     Tobacco Use     Smoking status: Current Every Day Smoker     Packs/day: 1.00     Years: 30.00     Pack years: 30.00     Types: Cigarettes     Smokeless tobacco: Never Used   Substance  Use Topics     Alcohol use: No     Frequency: Never     Family History   Problem Relation Age of Onset     Ovarian Cancer Mother      Emphysema Mother      Diabetes Father      Pancreatic Cancer Father      Diabetes Maternal Grandmother      Melanoma Brother      Diabetes Sister      Attention Deficit Disorder Son      ODD Son      Diabetes Sister      Heart Disease Sister      Coronary Artery Disease Sister      Asthma Sister      Coronary Artery Disease Sister      Autism Spectrum Disorder Son          Current Outpatient Medications   Medication Sig Dispense Refill     albuterol (PROAIR HFA) 108 (90 Base) MCG/ACT inhaler Inhale 2 puffs into the lungs 4 times daily as needed 8.5 g 11     atorvastatin (LIPITOR) 40 MG tablet Take 1 tablet (40 mg) by mouth daily 30 tablet 2     blood glucose (NO BRAND SPECIFIED) lancets standard Use to test blood sugar 3 times daily or as directed. - Uncontrolled DM2 on insulin 100 each 11     blood glucose (NO BRAND SPECIFIED) test strip Use to test blood sugar 3 times daily or as directed. - Uncontrolled DM2 on insulin 100 each 11     blood glucose monitoring (NO BRAND SPECIFIED) meter device kit Use to test blood sugar 3 times daily or as directed. - Uncontrolled DM2 on insulin 1 kit 1     FLUoxetine (PROZAC) 20 MG capsule Take 1 capsule (20 mg) by mouth daily 60 capsule 0     glipiZIDE-metFORMIN (METAGLIP) 5-500 MG tablet Take 1 tablet by mouth 2 times daily (before meals) 60 tablet 2     insulin detemir (LEVEMIR PEN) 100 UNIT/ML pen Inject 20 Units Subcutaneous At Bedtime --Okay to use Levemir or substitute for Basaglar --  whichever is preferred by insurance 15 mL 2     insulin pen needle (31G X 6 MM) 31G X 6 MM miscellaneous Use 1 pen needles daily or as directed. 100 each 3     lisinopril (PRINIVIL/ZESTRIL) 20 MG tablet Take 0.5 tablets (10 mg) by mouth daily 30 tablet 0     No Known Allergies    ROS:  As above    OBJECTIVE:     /90   Pulse 74   Temp 97.4  F (36.3  C)  "(Temporal)   Resp 20   Ht 1.613 m (5' 3.5\")   Wt 83.9 kg (185 lb)   LMP 03/30/2019   SpO2 98%   BMI 32.26 kg/m    Body mass index is 32.26 kg/m .  GENERAL: alert, no distress, over weight, fatigued and appears older than stated age  EYES: Eyes grossly normal to inspection, PERRL and conjunctivae and sclerae normal  HENT: ear canals and TM's normal, nose and mouth without ulcers or lesions  NECK: no adenopathy, no asymmetry, masses, or scars and thyroid normal to palpation  RESP: lungs clear to auscultation - no rales, rhonchi or wheezes  CV: regular rate and rhythm, normal S1 S2, no S3 or S4, no murmur, click or rub, no peripheral edema and peripheral pulses strong  ABDOMEN: soft, nontender, no hepatosplenomegaly, no masses and bowel sounds normal  MS: no gross musculoskeletal defects noted, no edema  SKIN: no suspicious lesions or rashes  NEURO: Normal strength and tone, mentation intact and speech normal  PSYCH: mentation appears normal, affect normal/bright    Diagnostic Test Results:  none     ASSESSMENT/PLAN:     1. Bipolar depression (H)  Refilled today.  - FLUoxetine (PROZAC) 20 MG capsule; Take 1 capsule (20 mg) by mouth daily  Dispense: 60 capsule; Refill: 11    2. Blurred vision  3. Uncontrolled type 2 diabetes mellitus without complication, with long-term current use of insulin  Likely due to uncontrolled diabetes. Has not complete an ophthalmology appt as instructed nearly a month ago, has not completed diabetic education as instructed. Has been having fairly significant headaches as well, likely related to levemir. Discontinued levemir today, will start glargine at 25units per night. Increase by 4 units every 4 days until fasting blood sugars consistently under 180. Then increase by 2 units every 4 days until fasting blood sugars in the normal range (). Make an eye appt ASAP. Make a diabetic education appt ASAP. Continue to check BS 4 times daily, in the am, noon, 1700, and bedtime. Ideally " "at meal times, though this varies significantly per patient report and she \"often doesn't eat more than once a day\". Reiterated the importance of diet, exercise, medications to control diabetes to help prevent long-term complications. Return to clinic in 1 month for recheck.   - EYE EXAM (SIMPLE-NONBILLABLE)    Calli Anderson NP  [facility] [place]        "

## 2019-04-25 NOTE — NURSING NOTE
"Chief Complaint   Patient presents with     Eye Problem     Visual Acuity Screening - Slaughter Chart   Visualacuity OD (right eye): 10/ 20   Visual acuity OS (left eye): 10/ 63     Corrective lenses worn: No       Initial BP (!) 174/94   Pulse 74   Temp 97.4  F (36.3  C) (Temporal)   Resp 20   Ht 1.613 m (5' 3.5\")   Wt 83.9 kg (185 lb)   LMP 03/30/2019   SpO2 98%   BMI 32.26 kg/m   Estimated body mass index is 32.26 kg/m  as calculated from the following:    Height as of this encounter: 1.613 m (5' 3.5\").    Weight as of this encounter: 83.9 kg (185 lb).    Medication Reconciliation: complete      Norma J. Gosselin, LPN  "

## 2019-04-25 NOTE — PATIENT INSTRUCTIONS
Need to:     Most importantly make an appt with the eye doctor!!! ASAP    Make an appt with the diabetic educator.     Continue to check blood sugars every morning (fasting), noon, 5pm and bedtime. These should be at mealtimes. We may very well need to add in fast acting insulin in the near future.     Continue to work very hard on eating a low carb diet, and exercise for at least 30 minutes straight most days of the week.

## 2019-04-26 ENCOUNTER — TELEPHONE (OUTPATIENT)
Dept: FAMILY MEDICINE | Facility: OTHER | Age: 44
End: 2019-04-26

## 2019-04-26 DIAGNOSIS — H53.8 BLURRED VISION: ICD-10-CM

## 2019-04-26 NOTE — TELEPHONE ENCOUNTER
Patient was in to see SJB yesterday 04/25/19 and stated that there should of been an Ophthalmology order placed. Patient is wanting referral for [facility].   Please advise.    Mariah Galvan on 4/26/2019 at 8:31 AM

## 2019-05-16 DIAGNOSIS — E11.65 UNCONTROLLED TYPE 2 DIABETES MELLITUS WITH HYPERGLYCEMIA (H): ICD-10-CM

## 2019-05-17 RX ORDER — LISINOPRIL 20 MG/1
10 TABLET ORAL DAILY
Qty: 30 TABLET | Refills: 0 | OUTPATIENT
Start: 2019-05-17

## 2019-05-17 NOTE — TELEPHONE ENCOUNTER
Chart review shows that Rx as requested was last filled as follows:    Outpatient Medication Detail      Disp Refills Start End DEONNA   lisinopril (PRINIVIL/ZESTRIL) 20 MG tablet 30 tablet 0 4/4/2019  No   Sig - Route: Take 0.5 tablets (10 mg) by mouth daily - Oral   Sent to pharmacy as: lisinopril (PRINIVIL/ZESTRIL) 20 MG tablet   Class: E-Prescribe   Order: [id number]   E-Prescribing Status: Receipt confirmed by pharmacy (4/4/2019  1:41 PM CDT)   Printout Tracking     External Result Report   Pharmacy     [id number] - [place], [place] - [address]     And is due for a refill as of 6/4/19. LOV with AYAN Mccurdy on 4/25/19 indicates that patient was to follow up in a month and no follow up is noted to be scheduled. Call placed to patient to discuss. Patient was not available but writer was able to leave a detailed reminder on patient's voicemail. As Rx as noted above is not due for a refill at this time and patient is due for an exam, writer will refuse Rx request in this encounter at this time.    Unable to complete prescription refill per RN Medication Refill Policy. Jhon Reid 5/17/2019 1:08 PM

## 2019-07-01 DIAGNOSIS — E78.2 MIXED HYPERLIPIDEMIA: ICD-10-CM

## 2019-07-01 DIAGNOSIS — E11.65 UNCONTROLLED TYPE 2 DIABETES MELLITUS WITH HYPERGLYCEMIA (H): ICD-10-CM

## 2019-07-01 DIAGNOSIS — F31.9 BIPOLAR DEPRESSION (H): ICD-10-CM

## 2019-07-01 NOTE — TELEPHONE ENCOUNTER
Left message asking what medication she needs filled and pharmacy.    Norma J. Gosselin LPN......  7/1/2019   9:39 AM

## 2019-07-01 NOTE — TELEPHONE ENCOUNTER
Patient would like to know if she can have refill on meds to get her through to her appt on 07/03/2019. Offered earlier appointments to patient but due to lack of transportation, patient unable to come in to clinic until 07/03/2019. Please call patient and advise. Thank you!      Christine Azevedo on 7/1/2019 at 9:12 AM

## 2019-07-01 NOTE — TELEPHONE ENCOUNTER
Please call Beatriz and ask her how much insulin she is currently taking. We wrote for her to increase until blood sugars were in normal range, could have been a significant difference from what the initial dose was.

## 2019-07-03 ENCOUNTER — OFFICE VISIT (OUTPATIENT)
Dept: FAMILY MEDICINE | Facility: OTHER | Age: 44
End: 2019-07-03
Attending: NURSE PRACTITIONER
Payer: COMMERCIAL

## 2019-07-03 VITALS
TEMPERATURE: 98 F | OXYGEN SATURATION: 95 % | SYSTOLIC BLOOD PRESSURE: 136 MMHG | RESPIRATION RATE: 18 BRPM | DIASTOLIC BLOOD PRESSURE: 80 MMHG | WEIGHT: 185 LBS | HEART RATE: 74 BPM | BODY MASS INDEX: 31.58 KG/M2 | HEIGHT: 64 IN

## 2019-07-03 DIAGNOSIS — E78.2 MIXED HYPERLIPIDEMIA: ICD-10-CM

## 2019-07-03 DIAGNOSIS — I10 ESSENTIAL HYPERTENSION: Primary | ICD-10-CM

## 2019-07-03 LAB
ALBUMIN UR-MCNC: NEGATIVE MG/DL
APPEARANCE UR: CLEAR
BILIRUB UR QL STRIP: NEGATIVE
COLOR UR AUTO: YELLOW
GLUCOSE UR STRIP-MCNC: NEGATIVE MG/DL
HBA1C MFR BLD: 7.4 % (ref 4–6)
HGB UR QL STRIP: ABNORMAL
KETONES UR STRIP-MCNC: NEGATIVE MG/DL
LEUKOCYTE ESTERASE UR QL STRIP: NEGATIVE
NITRATE UR QL: NEGATIVE
PH UR STRIP: 7 PH (ref 5–9)
SOURCE: ABNORMAL
SP GR UR STRIP: 1.01 (ref 1–1.03)
TSH SERPL DL<=0.05 MIU/L-ACNC: 2.78 IU/ML (ref 0.34–5.6)
UROBILINOGEN UR STRIP-ACNC: 0.2 EU/DL (ref 0.2–1)

## 2019-07-03 PROCEDURE — 36415 COLL VENOUS BLD VENIPUNCTURE: CPT | Mod: ZL | Performed by: NURSE PRACTITIONER

## 2019-07-03 PROCEDURE — 99213 OFFICE O/P EST LOW 20 MIN: CPT | Performed by: NURSE PRACTITIONER

## 2019-07-03 PROCEDURE — 81003 URINALYSIS AUTO W/O SCOPE: CPT | Mod: ZL | Performed by: NURSE PRACTITIONER

## 2019-07-03 PROCEDURE — 84443 ASSAY THYROID STIM HORMONE: CPT | Mod: ZL | Performed by: NURSE PRACTITIONER

## 2019-07-03 PROCEDURE — 83036 HEMOGLOBIN GLYCOSYLATED A1C: CPT | Mod: ZL | Performed by: NURSE PRACTITIONER

## 2019-07-03 PROCEDURE — G0463 HOSPITAL OUTPT CLINIC VISIT: HCPCS

## 2019-07-03 PROCEDURE — 82043 UR ALBUMIN QUANTITATIVE: CPT | Mod: ZL | Performed by: NURSE PRACTITIONER

## 2019-07-03 RX ORDER — GLIPIZIDE AND METFORMIN HCL 5; 500 MG/1; MG/1
1 TABLET, FILM COATED ORAL
Qty: 60 TABLET | Refills: 2 | Status: SHIPPED | OUTPATIENT
Start: 2019-07-03 | End: 2019-10-04

## 2019-07-03 RX ORDER — LISINOPRIL 20 MG/1
20 TABLET ORAL DAILY
Qty: 30 TABLET | Refills: 3 | Status: SHIPPED | OUTPATIENT
Start: 2019-07-03 | End: 2019-10-31

## 2019-07-03 RX ORDER — INSULIN GLARGINE 100 [IU]/ML
19 INJECTION, SOLUTION SUBCUTANEOUS AT BEDTIME
Qty: 3 ML | Refills: 11 | Status: SHIPPED | OUTPATIENT
Start: 2019-07-03 | End: 2020-04-29

## 2019-07-03 RX ORDER — ATORVASTATIN CALCIUM 40 MG/1
40 TABLET, FILM COATED ORAL DAILY
Qty: 30 TABLET | Refills: 2 | Status: SHIPPED | OUTPATIENT
Start: 2019-07-03 | End: 2019-10-08

## 2019-07-03 ASSESSMENT — PATIENT HEALTH QUESTIONNAIRE - PHQ9: SUM OF ALL RESPONSES TO PHQ QUESTIONS 1-9: 9

## 2019-07-03 ASSESSMENT — MIFFLIN-ST. JEOR: SCORE: 1466.21

## 2019-07-03 ASSESSMENT — PAIN SCALES - GENERAL: PAINLEVEL: NO PAIN (0)

## 2019-07-03 NOTE — NURSING NOTE
"Chief Complaint   Patient presents with     Diabetes     Refill Request         Initial /80   Pulse 74   Temp 98  F (36.7  C) (Temporal)   Resp 18   Ht 1.613 m (5' 3.5\")   Wt 83.9 kg (185 lb)   LMP 05/13/2019   SpO2 95%   BMI 32.26 kg/m   Estimated body mass index is 32.26 kg/m  as calculated from the following:    Height as of this encounter: 1.613 m (5' 3.5\").    Weight as of this encounter: 83.9 kg (185 lb).    Medication Reconciliation: complete      Norma J. Gosselin, LPN  "

## 2019-07-03 NOTE — LETTER
July 15, 2019      Beatriz Leal  [address]  [facility] [place] [address]        Dear ,    We are writing to inform you of your test results.    Your A1C has dropped very nicely, even with the increased sugars you have had at times! Continue as you are doing with insulin and see me in clinic in 3 months for recheck. If it is stable at that time, can go out to 6 month appts!    Resulted Orders   Thyrotropin GH   Result Value Ref Range    Thyrotropin 2.78 0.34 - 5.60 IU/mL   Hemoglobin A1c   Result Value Ref Range    Hemoglobin A1C 7.4 (H) 4.0 - 6.0 %   Urine, Macroscopic Only   Result Value Ref Range    Color Urine Yellow     Appearance Urine Clear     Glucose Urine Negative NEG^Negative mg/dL    Bilirubin Urine Negative NEG^Negative    Ketones Urine Negative NEG^Negative mg/dL    Specific Gravity Urine 1.015 1.000 - 1.030    Blood Urine Small (A) NEG^Negative    pH Urine 7.0 5.0 - 9.0 pH    Protein Albumin Urine Negative NEG^Negative mg/dL    Urobilinogen Urine 0.2 0.2 - 1.0 EU/dL    Nitrite Urine Negative NEG^Negative    Leukocyte Esterase Urine Negative NEG^Negative    Source Urine    Albumin Random Urine Quantitative with Creat Ratio   Result Value Ref Range    Creatinine Urine 65 mg/dL    Albumin Urine mg/L 22 mg/L    Albumin Urine mg/g Cr 33.49 (H) 0 - 25 mg/g Cr       If you have any questions or concerns, please call the clinic at the number listed above.       Sincerely,        Calli Anderson NP

## 2019-07-03 NOTE — PROGRESS NOTES
Subjective     Beatriz Leal is a 43 year old female who presents to clinic today for the following health issues:    HPI   Diabetes Follow-up      How often are you checking your blood sugar? Three times daily    What time of day are you checking your blood sugars (select all that apply)?  After meals    Have you had any blood sugars above 200?  Yes 1 or 2 since they have consistently been below that    Have you had any blood sugars below 70?  Yes dropped to 63 x1. Came up nicely after eating a snack    What symptoms do you notice when your blood sugar is low?  Dizzy and Confusion    What concerns do you have today about your diabetes? Other: feet burning from time to time, a couple days a week     Do you have any of these symptoms? (Select all that apply)  Numbness in feet, Burning in feet and Blurry vision that has improved since last visit     Have you had a diabetic eye exam in the last 12 months? Yes- Date of last eye exam: May 2019    BP Readings from Last 2 Encounters:   07/03/19 136/80   04/25/19 144/76     Hemoglobin A1C (%)   Date Value   03/31/2019 13.6 (H)     LDL Cholesterol Calculated (mg/dL)   Date Value   03/31/2019     Cannot estimate LDL when triglyceride exceeds 400 mg/dL       Diabetes Management Resources      Hypertension Follow-up      Do you check your blood pressure regularly outside of the clinic? No     Are you following a low salt diet? No    Are your blood pressures ever more than 140 on the top number (systolic) OR more   than 90 on the bottom number (diastolic), for example 140/90? Not checking at home    Amount of exercise or physical activity: walks up and down road every other day    Problems taking medications regularly: Yes,  Ran out of lisinopril about a month ago, ran out of glipizide a few days ago    Medication side effects: none    Diet: diabetic    Patient Active Problem List   Diagnosis     Uncontrolled type 2 diabetes mellitus without complication, with long-term  current use of insulin (H)     Bacteria in urine     Mixed hyperlipidemia     Essential hypertension     Anxiety and depression     Migraine headache     Mild persistent asthma without complication     Right hip pain     Vaginal yeast infection     Past Surgical History:   Procedure Laterality Date     ceserean section      x2     CHOLECYSTECTOMY       TUBAL LIGATION         Social History     Tobacco Use     Smoking status: Current Every Day Smoker     Packs/day: 1.00     Years: 30.00     Pack years: 30.00     Types: Cigarettes     Smokeless tobacco: Never Used   Substance Use Topics     Alcohol use: No     Frequency: Never     Family History   Problem Relation Age of Onset     Ovarian Cancer Mother      Emphysema Mother      Diabetes Father      Pancreatic Cancer Father      Diabetes Maternal Grandmother      Melanoma Brother      Diabetes Sister      Attention Deficit Disorder Son      ODD Son      Diabetes Sister      Heart Disease Sister      Coronary Artery Disease Sister      Asthma Sister      Coronary Artery Disease Sister      Autism Spectrum Disorder Son          Current Outpatient Medications   Medication Sig Dispense Refill     albuterol (PROAIR HFA) 108 (90 Base) MCG/ACT inhaler Inhale 2 puffs into the lungs 4 times daily as needed 8.5 g 11     atorvastatin (LIPITOR) 40 MG tablet Take 1 tablet (40 mg) by mouth daily 30 tablet 2     blood glucose (NO BRAND SPECIFIED) lancets standard Use to test blood sugar 3 times daily or as directed. - Uncontrolled DM2 on insulin 100 each 11     blood glucose (NO BRAND SPECIFIED) test strip Use to test blood sugar 3 times daily or as directed. - Uncontrolled DM2 on insulin 100 each 11     blood glucose monitoring (NO BRAND SPECIFIED) meter device kit Use to test blood sugar 3 times daily or as directed. - Uncontrolled DM2 on insulin 1 kit 1     FLUoxetine (PROZAC) 20 MG capsule Take 1 capsule (20 mg) by mouth daily 60 capsule 11     glipiZIDE-metFORMIN (METAGLIP)  "5-500 MG tablet Take 1 tablet by mouth 2 times daily (before meals) 60 tablet 2     insulin glargine (BASAGLAR KWIKPEN) 100 UNIT/ML pen Inject 25 Units Subcutaneous At Bedtime Increase by 4 units every 4 days until fasting blood sugars consistently under 180. Then continue to increase by 2 units every 4 days until fasting blood sugars consistently in the normal range (). 3 mL 11     insulin pen needle (31G X 6 MM) 31G X 6 MM miscellaneous Use 1 pen needles daily or as directed. 100 each 3     lisinopril (PRINIVIL/ZESTRIL) 20 MG tablet Take 0.5 tablets (10 mg) by mouth daily 30 tablet 0     No Known Allergies    Reviewed and updated as needed this visit by Provider  Tobacco  Allergies  Meds  Med Hx  Surg Hx  Fam Hx  Soc Hx        Review of Systems   ROS COMP: As above      Objective    /80   Pulse 74   Temp 98  F (36.7  C) (Temporal)   Resp 18   Ht 1.613 m (5' 3.5\")   Wt 83.9 kg (185 lb)   LMP 05/13/2019   SpO2 95%   BMI 32.26 kg/m    Body mass index is 32.26 kg/m .  Physical Exam   GENERAL: healthy, alert and no distress  EYES: Eyes grossly normal to inspection, PERRL and conjunctivae and sclerae normal  NECK: no adenopathy, no asymmetry, masses, or scars and thyroid normal to palpation  RESP: lungs clear to auscultation - no rales, rhonchi or wheezes  CV: regular rate and rhythm, normal S1 S2, no S3 or S4, no murmur, click or rub, no peripheral edema and peripheral pulses strong  ABDOMEN: soft, nontender, no hepatosplenomegaly, no masses and bowel sounds normal  MS: no gross musculoskeletal defects noted, no edema  SKIN: no suspicious lesions or rashes  NEURO: Normal strength and tone, mentation intact and speech normal  PSYCH: mentation appears normal, affect normal/bright  Diabetic foot exam: normal DP and PT pulses, no trophic changes or ulcerative lesions and normal sensory exam    Diagnostic Test Results:  Labs reviewed in Epic  Results for orders placed or performed in visit on " 07/03/19   Thyrotropin GH   Result Value Ref Range    Thyrotropin 2.78 0.34 - 5.60 IU/mL   Hemoglobin A1c   Result Value Ref Range    Hemoglobin A1C 7.4 (H) 4.0 - 6.0 %   Urine, Macroscopic Only   Result Value Ref Range    Color Urine Yellow     Appearance Urine Clear     Glucose Urine Negative NEG^Negative mg/dL    Bilirubin Urine Negative NEG^Negative    Ketones Urine Negative NEG^Negative mg/dL    Specific Gravity Urine 1.015 1.000 - 1.030    Blood Urine Small (A) NEG^Negative    pH Urine 7.0 5.0 - 9.0 pH    Protein Albumin Urine Negative NEG^Negative mg/dL    Urobilinogen Urine 0.2 0.2 - 1.0 EU/dL    Nitrite Urine Negative NEG^Negative    Leukocyte Esterase Urine Negative NEG^Negative    Source Urine    Albumin Random Urine Quantitative with Creat Ratio   Result Value Ref Range    Creatinine Urine 65 mg/dL    Albumin Urine mg/L 22 mg/L    Albumin Urine mg/g Cr 33.49 (H) 0 - 25 mg/g Cr           Assessment & Plan     1. Mixed hyperlipidemia  Controlled with medication, refilled today.   - atorvastatin (LIPITOR) 40 MG tablet; Take 1 tablet (40 mg) by mouth daily  Dispense: 30 tablet; Refill: 2    2. Uncontrolled type 2 diabetes mellitus without complication, with long-term current use of insulin (H)  Significant improvement in A1C without any increase in glargine insulin - taking 19units at HS. Refilled today. Continue checking BS daily, when symptomatic. Encouraged exercise to help lower A1C further. Return to clinic in 3 months.  - blood glucose (NO BRAND SPECIFIED) lancets standard; Use to test blood sugar 3 times daily or as directed. - Uncontrolled DM2 on insulin  Dispense: 100 each; Refill: 11  - blood glucose (NO BRAND SPECIFIED) test strip; Use to test blood sugar 3 times daily or as directed. - Uncontrolled DM2 on insulin  Dispense: 100 each; Refill: 11  - glipiZIDE-metFORMIN (METAGLIP) 5-500 MG tablet; Take 1 tablet by mouth 2 times daily (before meals)  Dispense: 60 tablet; Refill: 2  - insulin glargine  (BASAGLAR KWIKPEN) 100 UNIT/ML pen; Inject 19 Units Subcutaneous At Bedtime  Dispense: 3 mL; Refill: 11  - insulin pen needle (31G X 6 MM) 31G X 6 MM miscellaneous; Use 1 pen needles daily or as directed.  Dispense: 100 each; Refill: 3  - Hemoglobin A1c; Future  - Thyrotropin GH; Future  - Albumin Random Urine Quantitative with Creat Ratio; Future  - Urine, Macroscopic Only; Future  - Thyrotropin GH  - Hemoglobin A1c  - Urine, Macroscopic Only  - Albumin Random Urine Quantitative with Creat Ratio    3. Essential hypertension  Controlled on lisinopril, refilled. Encouraged checking at home when able.  - lisinopril (PRINIVIL/ZESTRIL) 20 MG tablet; Take 1 tablet (20 mg) by mouth daily  Dispense: 30 tablet; Refill: 3       Calli Anderson NP  [facility]

## 2019-07-03 NOTE — LETTER
My Depression Action Plan  Name: Beatriz Leal   Date of Birth 1975  Date: 7/3/2019    My doctor: No Ref-Primary, Physician   My clinic: [facility]  [address]  [place] [id number] [phone]          GREEN    ZONE   Good Control    What it looks like:     Things are going generally well. You have normal up s and down s. You may even feel depressed from time to time, but bad moods usually last less than a day.   What you need to do:  1. Continue to care for yourself (see self care plan)  2. Check your depression survival kit and update it as needed  3. Follow your physician s recommendations including any medication.  4. Do not stop taking medication unless you consult with your physician first.           YELLOW         ZONE Getting Worse    What it looks like:     Depression is starting to interfere with your life.     It may be hard to get out of bed; you may be starting to isolate yourself from others.    Symptoms of depression are starting to last most all day and this has happened for several days.     You may have suicidal thoughts but they are not constant.   What you need to do:     1. Call your care team, your response to treatment will improve if you keep your care team informed of your progress. Yellow periods are signs an adjustment may need to be made.     2. Continue your self-care, even if you have to fake it!    3. Talk to someone in your support network    4. Open up your depression survival kit           RED    ZONE Medical Alert - Get Help    What it looks like:     Depression is seriously interfering with your life.     You may experience these or other symptoms: You can t get out of bed most days, can t work or engage in other necessary activities, you have trouble taking care of basic hygiene, or basic responsibilities, thoughts of suicide or death that will not go away, self-injurious behavior.     What you need to do:  1. Call your  care team and request a same-day appointment. If they are not available (weekends or after hours) call your local crisis line, emergency room or 911.            Depression Self Care Plan / Survival Kit    Self-Care for Depression  Here s the deal. Your body and mind are really not as separate as most people think.  What you do and think affects how you feel and how you feel influences what you do and think. This means if you do things that people who feel good do, it will help you feel better.  Sometimes this is all it takes.  There is also a place for medication and therapy depending on how severe your depression is, so be sure to consult with your medical provider and/ or Behavioral Health Consultant if your symptoms are worsening or not improving.     In order to better manage my stress, I will:    Exercise  Get some form of exercise, every day. This will help reduce pain and release endorphins, the  feel good  chemicals in your brain. This is almost as good as taking antidepressants!  This is not the same as joining a gym and then never going! (they count on that by the way ) It can be as simple as just going for a walk or doing some gardening, anything that will get you moving.      Hygiene   Maintain good hygiene (Get out of bed in the morning, Make your bed, Brush your teeth, Take a shower, and Get dressed like you were going to work, even if you are unemployed).  If your clothes don't fit try to get ones that do.    Diet  I will strive to eat foods that are good for me, drink plenty of water, and avoid excessive sugar, caffeine, alcohol, and other mood-altering substances.  Some foods that are helpful in depression are: complex carbohydrates, B vitamins, flaxseed, fish or fish oil, fresh fruits and vegetables.    Psychotherapy  I agree to participate in Individual Therapy (if recommended).    Medication  If prescribed medications, I agree to take them.  Missing doses can result in serious side effects.  I  understand that drinking alcohol, or other illicit drug use, may cause potential side effects.  I will not stop my medication abruptly without first discussing it with my provider.    Staying Connected With Others  I will stay in touch with my friends, family members, and my primary care provider/team.    Use your imagination  Be creative.  We all have a creative side; it doesn t matter if it s oil painting, sand castles, or mud pies! This will also kick up the endorphins.    Witness Beauty  (AKA stop and smell the roses) Take a look outside, even in mid-winter. Notice colors, textures. Watch the squirrels and birds.     Service to others  Be of service to others.  There is always someone else in need.  By helping others we can  get out of ourselves  and remember the really important things.  This also provides opportunities for practicing all the other parts of the program.    Humor  Laugh and be silly!  Adjust your TV habits for less news and crime-drama and more comedy.    Control your stress  Try breathing deep, massage therapy, biofeedback, and meditation. Find time to relax each day.     My support system    Clinic Contact:  Phone number:    Contact 1:  Phone number:    Contact 2:  Phone number:    [facility]/:  Phone number:    Therapist:  Phone number:    Local crisis center:    Phone number:    Other community support:  Phone number:

## 2019-07-04 LAB
CREAT UR-MCNC: 65 MG/DL
MICROALBUMIN UR-MCNC: 22 MG/L
MICROALBUMIN/CREAT UR: 33.49 MG/G CR (ref 0–25)

## 2019-07-04 ASSESSMENT — ASTHMA QUESTIONNAIRES: ACT_TOTALSCORE: 21

## 2019-07-08 RX ORDER — GLIPIZIDE AND METFORMIN HCL 5; 500 MG/1; MG/1
TABLET, FILM COATED ORAL
Qty: 60 TABLET | Refills: 0 | OUTPATIENT
Start: 2019-07-08

## 2019-07-08 RX ORDER — ATORVASTATIN CALCIUM 40 MG/1
TABLET, FILM COATED ORAL
Qty: 30 TABLET | Refills: 0 | OUTPATIENT
Start: 2019-07-08

## 2019-07-08 NOTE — TELEPHONE ENCOUNTER
Metaglip refilled on 7/3/19 #60 x 2 refills  Atorvastatin refilled on7/3/19 #30 x 2 refills to [facility].    Jordyn Jiménez RN on 7/8/2019 at 11:52 AM

## 2019-07-11 RX ORDER — ATORVASTATIN CALCIUM 40 MG/1
40 TABLET, FILM COATED ORAL DAILY
Qty: 30 TABLET | Refills: 2 | Status: CANCELLED | OUTPATIENT
Start: 2019-07-11

## 2019-07-11 RX ORDER — INSULIN GLARGINE 100 [IU]/ML
25 INJECTION, SOLUTION SUBCUTANEOUS AT BEDTIME
Qty: 3 ML | Refills: 11 | Status: CANCELLED | OUTPATIENT
Start: 2019-07-11

## 2019-07-11 RX ORDER — LISINOPRIL 20 MG/1
10 TABLET ORAL DAILY
Qty: 30 TABLET | Refills: 0 | Status: CANCELLED | OUTPATIENT
Start: 2019-07-11

## 2019-07-11 RX ORDER — GLIPIZIDE AND METFORMIN HCL 5; 500 MG/1; MG/1
1 TABLET, FILM COATED ORAL
Qty: 60 TABLET | Refills: 2 | Status: CANCELLED | OUTPATIENT
Start: 2019-07-11

## 2019-07-18 NOTE — TELEPHONE ENCOUNTER
Was the Prozac suppose to be ordered at last office visit? Please advise.  Jesu Green LPN ..............7/18/2019 3:14 PM

## 2019-10-03 NOTE — TELEPHONE ENCOUNTER
[facility]   Name of Medication: Glipizide-Metformin 5/500 gm  Quantity requested: 60  Last fill date: 9/2/19  Last office visit: 7/3/19  PCP: No PCP  Controlled Substance Agreement: n/a  Associated Diagnosis: Uncontrolled type 2 diabetes         Marcela Jiménez LPN  10/3/2019  2:08 PM

## 2019-10-04 RX ORDER — GLIPIZIDE AND METFORMIN HCL 5; 500 MG/1; MG/1
1 TABLET, FILM COATED ORAL
Qty: 60 TABLET | Refills: 2 | Status: SHIPPED | OUTPATIENT
Start: 2019-10-04 | End: 2020-04-03

## 2019-10-04 NOTE — TELEPHONE ENCOUNTER
"Requested Prescriptions   Pending Prescriptions Disp Refills     glipiZIDE-metFORMIN (METAGLIP) 5-500 MG tablet 60 tablet 2     Sig: Take 1 tablet by mouth 2 times daily (before meals)       Combination Oral Antihyperglycemic Agents Passed - 10/3/2019  2:10 PM        Passed - Blood pressure under 140/90 in past 12 months     BP Readings from Last 3 Encounters:   07/03/19 136/80   04/25/19 144/76   04/04/19 118/64                 Passed - Patient has a documented LDL level within past 12 mos.     Recent Labs   Lab Test 03/31/19  1319   LDL Cannot estimate LDL when triglyceride exceeds 400 mg/dL             Passed - Patient has a documented Microalbumin level within past 12 mos.     Recent Labs   Lab Test 07/03/19  1531   MICROL 22   UMALCR 33.49*             Passed - Patient has documented A1c within the specified period of time.     If HgbA1C is 8 or greater, it needs to be on file within the past 3 months.  If less than 8, must be on file within the past 6 months.     Recent Labs   Lab Test 07/03/19  1520   A1C 7.4*             Passed - Patient's CR is NOT>1.4 OR Patient's EGFR is NOT<45 within past 12 mos.     Recent Labs   Lab Test 03/31/19  1319   GFRESTIMATED 79   GFRESTBLACK >90       Recent Labs   Lab Test 03/31/19  1319   CR 0.79             Passed - Patient does not have a diagnosis of CHF.        Passed - Medication is active on med list        Passed - Patient is 18 years old or older.        Passed - Patient is not pregnant        Passed - Patient has not had a positive pregnancy test within the past 12 mos.        Passed - Recent (6 mo) or future (30 days) visit within the authorizing provider's specialty     Patient had office visit in the last 6 months or has a visit in the next 30 days with authorizing provider or within the authorizing provider's specialty.  See \"Patient Info\" tab in inbasket, or \"Choose Columns\" in Meds & Orders section of the refill encounter.            LOV 7/3/19  Prescription " approved per [facility] Refill Protocol.  Brenda J. Goodell, RN on 10/4/2019 at 3:06 PM

## 2019-10-07 DIAGNOSIS — E78.2 MIXED HYPERLIPIDEMIA: ICD-10-CM

## 2019-10-08 RX ORDER — ATORVASTATIN CALCIUM 40 MG/1
40 TABLET, FILM COATED ORAL DAILY
Qty: 90 TABLET | Refills: 1 | Status: SHIPPED | OUTPATIENT
Start: 2019-10-08 | End: 2020-04-29

## 2019-10-08 NOTE — TELEPHONE ENCOUNTER
"Requested Prescriptions   Pending Prescriptions Disp Refills     atorvastatin (LIPITOR) 40 MG tablet 30 tablet 2     Sig: Take 1 tablet (40 mg) by mouth daily       Statins Protocol Failed - 10/8/2019  1:20 PM        Failed - Recent (12 mo) or future (30 days) visit within the authorizing provider's specialty     Patient has had an office visit with the authorizing provider or a provider within the authorizing providers department within the previous 12 mos or has a future within next 30 days. See \"Patient Info\" tab in inbasket, or \"Choose Columns\" in Meds & Orders section of the refill encounter.    Has seen DUKE GILLESPIE 7/3/2019 with discussion of this medication and no changes.        Passed - LDL on file in past 12 months     Recent Labs   Lab Test 03/31/19  1319   LDL Cannot estimate LDL when triglyceride exceeds 400 mg/dL           Passed - No abnormal creatine kinase in past 12 months     No lab results found.           Passed - Medication is active on med list        Passed - Patient is age 18 or older        Passed - No active pregnancy on record        Passed - No positive pregnancy test in past 12 months      LOV-7/3/2009 with discussion of continuing this medication.  Prescription refilled per RN Medication RefillPolicy.................... Alessandra Arias RN ....................  10/8/2019   1:29 PM        "

## 2019-10-31 DIAGNOSIS — I10 ESSENTIAL HYPERTENSION: ICD-10-CM

## 2019-10-31 RX ORDER — LISINOPRIL 20 MG/1
20 TABLET ORAL DAILY
Qty: 30 TABLET | Refills: 3 | Status: SHIPPED | OUTPATIENT
Start: 2019-10-31 | End: 2020-04-02

## 2019-10-31 NOTE — TELEPHONE ENCOUNTER
Last office visit 07/03/2019. Previous patient of Calli Anderson.  Marcela Pepper LPN .......................10/31/2019  8:35 AM

## 2020-04-02 DIAGNOSIS — I10 ESSENTIAL HYPERTENSION: Primary | ICD-10-CM

## 2020-04-02 RX ORDER — LISINOPRIL 20 MG/1
TABLET ORAL
Qty: 90 TABLET | Refills: 0 | Status: SHIPPED | OUTPATIENT
Start: 2020-04-02 | End: 2020-04-29

## 2020-04-02 NOTE — LETTER
April 3, 2020      Beatriz Leal  [address] [address]        Dear Beatriz,       A refill of glipiZIDE-metFORMIN (METAGLIP) 5-500 MG tablet has been requested by your pharmacy and it appears you are a former patient of Calli Anderson NP.  As Calli Anderson NP is no longer with the clinic, we ask that you please contact [facility] for assistance in scheduling a medication management appointment with another one of our providers.    The refill request for this medication can then be discussed and addressed accordingly with your new primary care physician.  Your health is very important to us.  Please call the clinic at [phone] to schedule your appointment.    At this time, the refill request has been sent to one of our provider for review/consideration.    Thank you for choosing [facility] and [facility] for your health care needs.    Sincerely,    Refstaci AUGUSTE  [facility]

## 2020-04-02 NOTE — LETTER
April 2, 2020      Beatriz Leal  [address] [address]        Dear Beatriz,     A refill of Lisinopril 20 mg tablets has been requested by your pharmacy and we noticed that you are a former patient of Calli Anderson.  As she is no longer seeing Patients in the clinic, we ask that you please contact us to schedule an establish care appointment with another one of our providers here at [facility].     A LIMITED refill has been called into your pharmacy. Additional refills require you to complete this appointment.    Your health is very important to us.  Please call the clinic at [phone] to schedule your appointment.    If you are no longer using [facility] for your healthcare needs, please call to let us know. Doing so will remove you from our call/contact list.    Thank you for choosing [facility] and [facility] for your health care needs.    Sincerely,    Refill RN  [facility]

## 2020-04-02 NOTE — TELEPHONE ENCOUNTER
[id number] sent Rx request for the following:      LISINOPRIL 20MG TABLETS      Sig: TAKE 1 TABLET(20 MG) BY MOUTH DAILY    Last Prescription Date:   10/31/19  Last Fill Qty/Refills:         30, R-3    Last Office Visit:              7/3/19 (Previous PCP Calli Anderson)  Future Office visit:           None.    ACE Inhibitors (Including Combos) Protocol [id number]/02/2020 04:42 PM   Normal serum creatinine on file in past 12 months    Normal serum potassium on file in past 12 months     In the absence of Calli Anderson, it is recommended that Patient call to schedule appointment with one of our other providers to discuss your medications and medication refills. At the same time, Pt can discuss you might intend on establishing care with.    Reminder letter sent with the above information. Prescription approved per [facility] Refill Protocol for 90 day zak refill. Wendy Evans RN .............. 4/2/2020  4:48 PM

## 2020-04-03 RX ORDER — GLIPIZIDE AND METFORMIN HCL 5; 500 MG/1; MG/1
1 TABLET, FILM COATED ORAL
Qty: 60 TABLET | Refills: 2 | Status: SHIPPED | OUTPATIENT
Start: 2020-04-03 | End: 2020-04-29

## 2020-04-03 NOTE — TELEPHONE ENCOUNTER
"[facility] sent Rx request for the following:      glipiZIDE-metFORMIN (METAGLIP) 5-500 MG tablet  Sig: Take 1 tablet by mouth 2 times daily (before meals)      Last Prescription Date:   10/4/2019  Last Fill Qty/Refills:         60, R-2    Last Office Visit:              7/3/2019   Future Office visit:           None    Letter sent to patient to establish care with a new PCP.     Requested Prescriptions   Pending Prescriptions Disp Refills     glipiZIDE-metFORMIN (METAGLIP) 5-500 MG tablet 60 tablet 2     Sig: Take 1 tablet by mouth 2 times daily (before meals)       Combination Oral Antihyperglycemic Agents Failed - 4/2/2020  4:18 PM        Failed - Patient has documented A1c within the specified period of time.     If HgbA1C is 8 or greater, it needs to be on file within the past 3 months.  If less than 8, must be on file within the past 6 months.     Recent Labs   Lab Test 07/03/19  1520   A1C 7.4*             Failed - Patient's CR is NOT>1.4 OR Patient's EGFR is NOT<45 within past 12 mos.     Recent Labs   Lab Test 03/31/19  1319   GFRESTIMATED 79   GFRESTBLACK >90       Recent Labs   Lab Test 03/31/19  1319   CR 0.79             Failed - Recent (6 mo) or future (30 days) visit within the authorizing provider's specialty     Patient had office visit in the last 6 months or has a visit in the next 30 days with authorizing provider or within the authorizing provider's specialty.  See \"Patient Info\" tab in inbasket, or \"Choose Columns\" in Meds & Orders section of the refill encounter.            Passed - Patient does not have a diagnosis of CHF.        Passed - Medication is active on med list        Passed - Patient is 18 years old or older.        Passed - Patient is not pregnant        Passed - Patient has not had a positive pregnancy test within the past 12 mos.           Unable to complete prescription refill per RN Medication Refill Policy.................... Ramon Robledo RN ....................  " 4/3/2020   2:01 PM

## 2020-04-27 NOTE — TELEPHONE ENCOUNTER
Script signed and in out box in my office, please fax to pharmacy ([facility]).    Please call Beatriz and let her know that I DC'd the levemir and have sent in basaglar insulin for her. It is the long-acting insulin as levemir is, though should not have the same side effects.    Below Average

## 2020-04-29 ENCOUNTER — VIRTUAL VISIT (OUTPATIENT)
Dept: FAMILY MEDICINE | Facility: OTHER | Age: 45
End: 2020-04-29
Attending: FAMILY MEDICINE
Payer: COMMERCIAL

## 2020-04-29 VITALS — HEIGHT: 64 IN | BODY MASS INDEX: 31.92 KG/M2 | WEIGHT: 187 LBS | TEMPERATURE: 97.7 F

## 2020-04-29 DIAGNOSIS — E78.2 MIXED HYPERLIPIDEMIA: ICD-10-CM

## 2020-04-29 DIAGNOSIS — I10 ESSENTIAL HYPERTENSION: ICD-10-CM

## 2020-04-29 DIAGNOSIS — F31.9 BIPOLAR DEPRESSION (H): ICD-10-CM

## 2020-04-29 PROCEDURE — 99442 ZZC PHYSICIAN TELEPHONE EVALUATION 11-20 MIN: CPT | Performed by: FAMILY MEDICINE

## 2020-04-29 RX ORDER — FLUOXETINE 40 MG/1
40 CAPSULE ORAL DAILY
Qty: 90 CAPSULE | Refills: 1 | Status: SHIPPED | OUTPATIENT
Start: 2020-04-29 | End: 2020-11-06

## 2020-04-29 RX ORDER — ATORVASTATIN CALCIUM 40 MG/1
40 TABLET, FILM COATED ORAL DAILY
Qty: 90 TABLET | Refills: 1 | Status: SHIPPED | OUTPATIENT
Start: 2020-04-29 | End: 2020-11-06

## 2020-04-29 RX ORDER — INSULIN GLARGINE 100 [IU]/ML
19 INJECTION, SOLUTION SUBCUTANEOUS AT BEDTIME
Qty: 3 ML | Refills: 11 | Status: SHIPPED | OUTPATIENT
Start: 2020-04-29 | End: 2021-05-21

## 2020-04-29 RX ORDER — GLIPIZIDE AND METFORMIN HCL 5; 500 MG/1; MG/1
1 TABLET, FILM COATED ORAL
Qty: 180 TABLET | Refills: 1 | Status: SHIPPED | OUTPATIENT
Start: 2020-04-29 | End: 2020-12-24

## 2020-04-29 RX ORDER — LISINOPRIL 20 MG/1
20 TABLET ORAL DAILY
Qty: 90 TABLET | Refills: 1 | Status: SHIPPED | OUTPATIENT
Start: 2020-04-29 | End: 2021-03-22

## 2020-04-29 ASSESSMENT — ANXIETY QUESTIONNAIRES
1. FEELING NERVOUS, ANXIOUS, OR ON EDGE: SEVERAL DAYS
3. WORRYING TOO MUCH ABOUT DIFFERENT THINGS: SEVERAL DAYS
2. NOT BEING ABLE TO STOP OR CONTROL WORRYING: NOT AT ALL
IF YOU CHECKED OFF ANY PROBLEMS ON THIS QUESTIONNAIRE, HOW DIFFICULT HAVE THESE PROBLEMS MADE IT FOR YOU TO DO YOUR WORK, TAKE CARE OF THINGS AT HOME, OR GET ALONG WITH OTHER PEOPLE: SOMEWHAT DIFFICULT
6. BECOMING EASILY ANNOYED OR IRRITABLE: MORE THAN HALF THE DAYS
GAD7 TOTAL SCORE: 6
7. FEELING AFRAID AS IF SOMETHING AWFUL MIGHT HAPPEN: NOT AT ALL
5. BEING SO RESTLESS THAT IT IS HARD TO SIT STILL: NOT AT ALL

## 2020-04-29 ASSESSMENT — PATIENT HEALTH QUESTIONNAIRE - PHQ9
SUM OF ALL RESPONSES TO PHQ QUESTIONS 1-9: 9
5. POOR APPETITE OR OVEREATING: MORE THAN HALF THE DAYS

## 2020-04-29 ASSESSMENT — PAIN SCALES - GENERAL: PAINLEVEL: NO PAIN (0)

## 2020-04-29 ASSESSMENT — MIFFLIN-ST. JEOR: SCORE: 1470.29

## 2020-04-29 NOTE — PROGRESS NOTES
"Beatriz Leal is a 44 year old female who is being evaluated via a billable telephone visit.      The patient has been notified of following:     \"This telephone visit will be conducted via a call between you and your physician/provider. We have found that certain health care needs can be provided without the need for a physical exam.  This service lets us provide the care you need with a short phone conversation.  If a prescription is necessary we can send it directly to your pharmacy.  If lab work is needed we can place an order for that and you can then stop by our lab to have the test done at a later time.    Telephone visits are billed at different rates depending on your insurance coverage. During this emergency period, for some insurers they may be billed the same as an in-person visit.  Please reach out to your insurance provider with any questions.    If during the course of the call the physician/provider feels a telephone visit is not appropriate, you will not be charged for this service.\"    Patient has given verbal consent for Telephone visit?  Yes    How would you like to obtain your AVS? Ezhart    Subjective     Beatriz Leal is a 44 year old female who presents to clinic today for the following health issues:    HPI    Diabetes Mellitus:  Checks her blood pressure morning fasting, before lunch, and before bed.  Reports that morning values typically range from 's, afternoon 150-170's, and lower blood sugar levels before bed.  She has been taking her long-acting insulin and Glipizide-Metformin as prescribed without complications or adverse effects.  She has had some episodes of low blood sugar causing dizziness and nausea and notes that this typically occurs if she does not eat a good lunch.  She states that she is trying to eat more frequent meals to help with this.  She has had occasional \"burning\" sensation over the lateral aspect of her feet.  She does not recall her last foot exam and " has not had a dilated eye exam within the past year.     Hypertension:  Reports that her blood pressure typically runs in 110's systolic.  She is taking her ACE-I as prescribed without complications or adverse effects.  She denies chest pain, vision changes, and lightheadedness.    Hyperlipidemia:  Taking her statin medication without complications or adverse effects.  Denies myalgias.    Depression:  Previously managed on Fluoxetine 40 mg but this was restarted at 20 mg a couple of months ago because she had missed her medication for multiple months.  She states that it helps her symptoms but that she felt they were better controlled on 40 mg.  She reports feeling easily overwhelmed, more anxious, and experiencing low energy/motivation.     Patient Active Problem List   Diagnosis     Uncontrolled type 2 diabetes mellitus without complication, with long-term current use of insulin (H)     Bacteria in urine     Mixed hyperlipidemia     Essential hypertension     Anxiety and depression     Migraine headache     Mild persistent asthma without complication     Right hip pain     Vaginal yeast infection     Past Surgical History:   Procedure Laterality Date     ceserean section      x2     CHOLECYSTECTOMY       TUBAL LIGATION         Social History     Tobacco Use     Smoking status: Current Every Day Smoker     Packs/day: 1.00     Years: 30.00     Pack years: 30.00     Types: Cigarettes     Smokeless tobacco: Never Used   Substance Use Topics     Alcohol use: No     Frequency: Never     Family History   Problem Relation Age of Onset     Ovarian Cancer Mother      Emphysema Mother      Diabetes Father      Pancreatic Cancer Father      Diabetes Maternal Grandmother      Melanoma Brother      Diabetes Sister      Attention Deficit Disorder Son      ODD Son      Diabetes Sister      Heart Disease Sister      Coronary Artery Disease Sister      Asthma Sister      Coronary Artery Disease Sister      Autism Spectrum Disorder  Son          Current Outpatient Medications   Medication Sig Dispense Refill     albuterol (PROAIR HFA) 108 (90 Base) MCG/ACT inhaler Inhale 2 puffs into the lungs 4 times daily as needed 8.5 g 11     atorvastatin (LIPITOR) 40 MG tablet Take 1 tablet (40 mg) by mouth daily 90 tablet 1     blood glucose (NO BRAND SPECIFIED) lancets standard Use to test blood sugar 3 times daily or as directed. - Uncontrolled DM2 on insulin 100 each 11     blood glucose (NO BRAND SPECIFIED) test strip Use to test blood sugar 3 times daily or as directed. - Uncontrolled DM2 on insulin 100 each 11     blood glucose monitoring (NO BRAND SPECIFIED) meter device kit Use to test blood sugar 3 times daily or as directed. - Uncontrolled DM2 on insulin 1 kit 1     FLUoxetine (PROZAC) 40 MG capsule Take 1 capsule (40 mg) by mouth daily 90 capsule 1     glipiZIDE-metFORMIN (METAGLIP) 5-500 MG tablet Take 1 tablet by mouth 2 times daily (before meals) 180 tablet 1     insulin glargine (BASAGLAR KWIKPEN) 100 UNIT/ML pen Inject 19 Units Subcutaneous At Bedtime 3 mL 11     insulin pen needle (31G X 6 MM) 31G X 6 MM miscellaneous Use 1 pen needles daily or as directed. 100 each 3     lisinopril (ZESTRIL) 20 MG tablet Take 1 tablet (20 mg) by mouth daily 90 tablet 1     No Known Allergies    Reviewed and updated as needed this visit by Provider    Review of Systems   Constitutional: Negative for chills and fever.   Psychiatric/Behavioral: Negative for suicidal ideas.   Denies garo.    Objective   Reported vitals:  There were no vitals taken for this visit.   alert and no distress  PSYCH: Alert and oriented times 3; coherent speech, normal   rate and volume, able to articulate logical thoughts, able   to abstract reason, no tangential thoughts, no hallucinations   or delusions  Her affect is normal  RESP: No cough, no audible wheezing, able to talk in full sentences  Remainder of exam unable to be completed due to telephone visits    Diagnostic Test  Results:  Labs reviewed in Epic    Assessment/Plan:  1. Uncontrolled type 2 diabetes mellitus without complication, with long-term current use of insulin (H)  Well-controlled on current regimen.  She will be due for her diabetic laboratory studies in July.  She is also due for diabetic foot and eye exams.  Continue current medication; refills provided.  Advised her to plan to follow-up in July for routine laboratory monitoring, provided pandemic restrictions are loosened at that point.  - insulin glargine (BASAGLAR KWIKPEN) 100 UNIT/ML pen; Inject 19 Units Subcutaneous At Bedtime  Dispense: 3 mL; Refill: 11  - glipiZIDE-metFORMIN (METAGLIP) 5-500 MG tablet; Take 1 tablet by mouth 2 times daily (before meals)  Dispense: 180 tablet; Refill: 1    2. Essential hypertension  BP at goal < 130/80.  Continue Lisinopril 10 mg daily.  Refill provided.  Plan for BMP for ACE-I monitoring in July with other laboratory studies.  - lisinopril (ZESTRIL) 20 MG tablet; Take 1 tablet (20 mg) by mouth daily  Dispense: 90 tablet; Refill: 1    3. Mixed hyperlipidemia  Continue Atorvastatin 40 mg daily.  Refill provided.  - atorvastatin (LIPITOR) 40 MG tablet; Take 1 tablet (40 mg) by mouth daily  Dispense: 90 tablet; Refill: 1    4. Bipolar depression (H)  Unclear diagnosis as she is managed solely on serotonin specific reuptake inhibitor medication and does not seem to have manic episodes.  Symptoms previously better controlled on higher dose of medication.  Increase Fluoxetine to 40 mg daily.  Prescription provided.  - FLUoxetine (PROZAC) 40 MG capsule; Take 1 capsule (40 mg) by mouth daily  Dispense: 90 capsule; Refill: 1    Phone call duration:  16 minutes    Roxanne Celis DO

## 2020-04-30 ASSESSMENT — ENCOUNTER SYMPTOMS
CHILLS: 0
FEVER: 0

## 2020-04-30 ASSESSMENT — ASTHMA QUESTIONNAIRES: ACT_TOTALSCORE: 22

## 2020-04-30 ASSESSMENT — ANXIETY QUESTIONNAIRES: GAD7 TOTAL SCORE: 6

## 2020-06-27 DIAGNOSIS — I10 ESSENTIAL HYPERTENSION: ICD-10-CM

## 2020-06-29 RX ORDER — LISINOPRIL 20 MG/1
TABLET ORAL
Qty: 90 TABLET | Refills: 1 | OUTPATIENT
Start: 2020-06-29

## 2020-06-29 NOTE — TELEPHONE ENCOUNTER
Redundant refill request refused: Too soon:    lisinopril (ZESTRIL) 20 MG tablet  90 tablet  1  4/29/2020   No    Sig - Route: Take 1 tablet (20 mg) by mouth daily - Oral    Sent to pharmacy as: Lisinopril 20 MG Oral Tablet (ZESTRIL)    Class: E-Prescribe    Order: [id number]    E-Prescribing Status: Receipt confirmed by pharmacy (4/29/2020  2:34 PM CDT)    Printout Tracking     External Result Report    Pharmacy     [id number] - [place] - [address]      Unable to complete prescription refill per RN Medication Refill Policy. Wendy Evans RN .............. 6/29/2020  1:56 PM

## 2020-09-28 ENCOUNTER — NURSE TRIAGE (OUTPATIENT)
Dept: FAMILY MEDICINE | Facility: OTHER | Age: 45
End: 2020-09-28

## 2020-09-28 DIAGNOSIS — Z79.4 ENCOUNTER FOR LONG-TERM (CURRENT) USE OF INSULIN (H): Primary | ICD-10-CM

## 2020-09-28 NOTE — LETTER
September 29, 2020      Beatriz Leal  [address]  [place] [address]        Dear Beatriz,     This is to remind you that you are over due for your 3 month follow up Diabetic visit.  Your last visit was on 04/29/2020.     Additional refills of your medication require you to complete this visit.    Please call [phone] to schedule your appointment.    Thank you for choosing [facility] for your health care needs.    Sincerely,      Refill RN  [facility]

## 2020-09-29 ENCOUNTER — NURSE TRIAGE (OUTPATIENT)
Dept: FAMILY MEDICINE | Facility: OTHER | Age: 45
End: 2020-09-29

## 2020-09-29 RX ORDER — INSULIN GLARGINE 100 [IU]/ML
19 INJECTION, SOLUTION SUBCUTANEOUS AT BEDTIME
Qty: 3 ML | Refills: 11 | OUTPATIENT
Start: 2020-09-29

## 2020-09-29 NOTE — TELEPHONE ENCOUNTER
Face to face with LAKISHA Spivey of below. OK to send for review and advisement. Norma Emery RN on 9/29/2020 at 1:08 PM

## 2020-09-29 NOTE — TELEPHONE ENCOUNTER
"S-(situation): Per call center- Patient states she is completely out of lisinopril and her needles, is hoping to get these today    B-(background): Refer to notes below. Patient states,  \" I went to [facility] to fill my other medications and they told me I didn't have any refills of the Lisinopril or my pen needles. I have been out of my lisinopril for close to a month\". Contacted [facility] and spoke with Stefan who verified receipt of 04/29/2020 refills. 'She has refills remaining for both\". Patient informed.     A-(assessment): Patient denies any symptoms now. States, \" I have been having headaches. I took my blood pressure yesterday at my mother- in -laws and it was 140 something over 98. I don't have a blood pressure cuff\".     R-(recommendations): Protocol recommends an OV now, \" I can't come in today. I don't have a ride'. Patient did  agree to call back if:   * Headache, blurred vision, difficulty talking, or difficulty walking occurs  * Chest pain or difficulty breathing occurs  * You want to go in to the office for a blood pressure check  * You become worse    Dr. Celis out of office today for her review. Will route to Dr. Pinon for review and advisement if indicated.     Norma Emery RN on 9/29/2020 at 12:50 PM    "

## 2020-09-29 NOTE — TELEPHONE ENCOUNTER
"Patient calls back.  Patient states, \" I went to [facility] to fill my other medications and they told me I didn't have any refills of the Lisinopril or my pen needles. I have been out of my lisinopril for close to am month. Patient informed of below. Patient verbalized understanding and intent to comply. \" I will have to call back and schedule that appointment\". Patient also agreed to call if any further concerns. \"Thank you so much. I certainly will\". Norma Emery RN on 9/29/2020 at 12:06 PM    "

## 2020-09-29 NOTE — TELEPHONE ENCOUNTER
Patient has refills of both. New prescription for insulin pen needles sent. Recommended patient follow up in clinic at her convenience for follow up on BP as it is elevated and she had been out of medication for a month.   Cierra Olivera PA-C on 9/29/2020 at 1:20 PM

## 2020-09-29 NOTE — TELEPHONE ENCOUNTER
Duplicate request. Unable to complete prescription refill per RN Medication Refill Policy. Wendy Evans RN .............. 9/29/2020  9:35 AM

## 2020-09-29 NOTE — TELEPHONE ENCOUNTER
"Refer to note below. Contacted [facility] and spoke with Stefan who verified receipt of 04/29/2020. 'She has refills remaining for both\".  Message left for patient requesting a return call to inform of refills remaining at [facility] and that she is due for DM visit. Reminder letter sent.   lisinopril (ZESTRIL) 20 MG tablet  90 tablet  1  4/29/2020   No    Sig - Route: Take 1 tablet (20 mg) by mouth daily - Oral    Sent to pharmacy as: Lisinopril 20 MG Oral Tablet (ZESTRIL)    Class: E-Prescribe    Order: [id number]    E-Prescribing Status: Receipt confirmed by pharmacy (4/29/2020  2:34 PM CDT)      Filled 04/29/2020 3 ml x 11. Per Stefan from [facility] patient has remaining refills.   insulin glargine (BASAGLAR KWIKPEN) 100 UNIT/ML pen  3 mL  11  4/29/2020   No    Sig - Route: Inject 19 Units Subcutaneous At Bedtime - Subcutaneous    Sent to pharmacy as: Basaglar KwikPen 100 UNIT/ML Subcutaneous Solution Pen-injector    Class: E-Prescribe    Notes to Pharmacy: If Basaglar is not covered by insurance, may substitute Lantus at same dose and frequency.      Order: [id number]    E-Prescribing Status: Receipt confirmed by pharmacy (4/29/2020  2:34 PM CDT)        [id number] - [place], [place] - [address]           insulin pen needle (31G X 6 MM) 31G X 6 MM miscellaneous 100 each 3     Sig: Use 1 pen needles daily or as directed.       Diabetic Supplies Protocol Passed - 9/29/2020 10:44 AM        Passed - Medication is active on med list        Passed - Patient is 18 years of age or older        Passed - Recent (6 mo) or future (30 days) visit within the authorizing provider's specialty     Patient had office visit in the last 6 months or has a visit in the next 30 days with authorizing provider.  See \"Patient Info\" tab in inbasket, or \"Choose Columns\" in Meds & Orders section of the refill encounter.               Last Office Visit: 04/29/2020-She will be due for her diabetic laboratory " studies in July.  She is also due for diabetic foot and eye exams.  Continue current medication; refills provided.  Advised her to plan to follow-up in July for routine laboratory monitoring, provided pandemic restrictions are loosened at that point.  Future Office visit:           Due for exam.  Limited refill per protocol and letter mailed.  Norma Emery RN ........   9/29/2020    11:11 AM

## 2020-09-29 NOTE — TELEPHONE ENCOUNTER
Patient states she is completely out of lisinopril and her needles, is hoping to get these today.     Harmony De Jesus on 9/29/2020 at 10:44 AM

## 2020-09-29 NOTE — TELEPHONE ENCOUNTER
Additional Information    Negative: Pregnant > 20 weeks or postpartum (< 6 weeks after delivery) and new hand or face swelling    Negative: Pregnant > 20 weeks and BP > 140/90    Negative: Sounds like a life-threatening emergency to the triager    BP Systolic BP >= 140 OR Diastolic >= 90 and postpartum (from 0 to 6 weeks after delivery)    Negative: Systolic BP >= 160 OR Diastolic >= 100, and any cardiac or neurologic symptoms (e.g., chest pain, difficulty breathing, unsteady gait, blurred vision)    Negative: Patient sounds very sick or weak to the triager    Protocols used: HIGH BLOOD PRESSURE-A-OH

## 2020-09-29 NOTE — TELEPHONE ENCOUNTER
Contacted patient and informed of Provider's response. Patient verbalized understanding and intent to comply. Norma Emery RN on 9/29/2020 at 2:15 PM

## 2020-11-05 DIAGNOSIS — F31.9 BIPOLAR DEPRESSION (H): ICD-10-CM

## 2020-11-05 DIAGNOSIS — E78.2 MIXED HYPERLIPIDEMIA: Primary | ICD-10-CM

## 2020-11-06 RX ORDER — FLUOXETINE 40 MG/1
CAPSULE ORAL
Qty: 30 CAPSULE | Refills: 0 | Status: SHIPPED | OUTPATIENT
Start: 2020-11-06 | End: 2020-12-03

## 2020-11-06 RX ORDER — ATORVASTATIN CALCIUM 40 MG/1
TABLET, FILM COATED ORAL
Qty: 30 TABLET | Refills: 0 | Status: SHIPPED | OUTPATIENT
Start: 2020-11-06 | End: 2020-12-18

## 2020-11-06 NOTE — TELEPHONE ENCOUNTER
/[facility] sent Rx request for the following:      Requested Prescriptions   Pending Prescriptions Disp Refills   FLUoxetine (PROZAC) 40 MG capsule [Pharmacy Med Name: FLUOXETINE 40MG CAPSULES] 90 capsule 1    Sig: TAKE 1 CAPSULE(40 MG) BY MOUTH DAILY   Last Prescription Date:   4/29/20  Last Fill Qty/Refills:         90, R-1       atorvastatin (LIPITOR) 40 MG tablet [Pharmacy Med Name: ATORVASTATIN 40MG TABLETS] 90 tablet 1    Sig: TAKE 1 TABLET(40 MG) BY MOUTH DAILY   Last Prescription Date:   4/29/20  Last Fill Qty/Refills:         90, R-1    Routing refill request to provider for review/approval because:   Statins Protocol Failed - 11/6/2020  9:00 AM       Failed - LDL on file in past 12 months     Last Office Visit:              4/29/20  Future Office visit:           None    Unable to complete prescription refill per RN Medication Refill Policy. Wendy Evans RN .............. 11/6/2020  10:12 AM

## 2020-11-13 NOTE — TELEPHONE ENCOUNTER
After verifying patient's name and date of birth, patient was given the below information.  Norma J. Gosselin, LPN....11/13/2020 12:08 PM

## 2020-12-03 ENCOUNTER — TELEPHONE (OUTPATIENT)
Dept: INTERNAL MEDICINE | Facility: OTHER | Age: 45
End: 2020-12-03

## 2020-12-03 ENCOUNTER — VIRTUAL VISIT (OUTPATIENT)
Dept: FAMILY MEDICINE | Facility: OTHER | Age: 45
End: 2020-12-03
Attending: FAMILY MEDICINE
Payer: COMMERCIAL

## 2020-12-03 DIAGNOSIS — F31.9 BIPOLAR DEPRESSION (H): ICD-10-CM

## 2020-12-03 PROCEDURE — 99441 PR PHYSICIAN TELEPHONE EVALUATION 5-10 MIN: CPT | Performed by: FAMILY MEDICINE

## 2020-12-03 RX ORDER — FLUOXETINE 40 MG/1
CAPSULE ORAL
Qty: 90 CAPSULE | Refills: 3 | Status: SHIPPED | OUTPATIENT
Start: 2020-12-03 | End: 2021-12-17

## 2020-12-03 ASSESSMENT — ENCOUNTER SYMPTOMS
CHILLS: 0
FEVER: 0

## 2020-12-03 ASSESSMENT — PATIENT HEALTH QUESTIONNAIRE - PHQ9: SUM OF ALL RESPONSES TO PHQ QUESTIONS 1-9: 6

## 2020-12-03 NOTE — PROGRESS NOTES
"Beatriz Leal is a 44 year old female who is being evaluated via a billable telephone visit.      The patient has been notified of following:     \"This telephone visit will be conducted via a call between you and your physician/provider. We have found that certain health care needs can be provided without the need for a physical exam.  This service lets us provide the care you need with a short phone conversation.  If a prescription is necessary we can send it directly to your pharmacy.  If lab work is needed we can place an order for that and you can then stop by our lab to have the test done at a later time.    Telephone visits are billed at different rates depending on your insurance coverage. During this emergency period, for some insurers they may be billed the same as an in-person visit.  Please reach out to your insurance provider with any questions.    If during the course of the call the physician/provider feels a telephone visit is not appropriate, you will not be charged for this service.\"    Patient has given verbal consent for Telephone visit?  Yes    What phone number would you like to be contacted at? [phone]    How would you like to obtain your AVS? Ezhart    Subjective     Beatriz Leal is a 44 year old female who presents via phone visit today for the following health issues:    Recheck Medication    Refill Prozac    HPI    Depression:  Has had Fluoxetine prescribed for several years but was recently restarted on it after missing her medication for multiple months.  Starting dose was increased to 40 mg daily back in April.  She reports that she is doing well and has seen improvement on the increased dose.  Specifically, her energy level and tendency to self-isolate have improved.  She denies any complications or adverse effects.  She is requesting a refill of the medication at current dose.    Review of Systems   Constitutional: Negative for chills and fever.   Psychiatric/Behavioral: " "Negative for suicidal ideas.      Objective   Vitals - Patient Reported  Weight (Patient Reported): 89.4 kg (197 lb)  Height (Patient Reported): 165.1 cm (5' 5\")  BMI (Based on Pt Reported Ht/Wt): 32.78  Temperature (Patient Reported): 97.7  F (36.5  C)  Pain Score: No Pain (0)  alert and no distress  PSYCH: Alert and oriented times 3; coherent speech, normal   rate and volume, able to articulate logical thoughts, able   to abstract reason, no tangential thoughts, no hallucinations   or delusions  Her affect is normal  RESP: No cough, no audible wheezing, able to talk in full sentences  Remainder of exam unable to be completed due to telephone visits    Assessment/Plan:    Assessment & Plan     Bipolar depression (H)  Unclear diagnosis as she is managed solely on serotonin specific reuptake inhibitor medication and does not seem to have manic episodes.  Symptoms have been improved on the higher dose of medication.  Continue Fluoxetine 40 mg daily.  Refill provided.  - FLUoxetine (PROZAC) 40 MG capsule; TAKE 1 CAPSULE(40 MG) BY MOUTH DAILY     Follow-up for annual physical exam.    DO ROMA Salazar [facility]    Phone call duration:  5 minutes              "

## 2020-12-03 NOTE — TELEPHONE ENCOUNTER
"Called pt and reminded her of 's message from 11/05/2020 \"30 day supply provided.  She needs an office visit for any further refills.\" Pt agreed to make appointment, transferred to scheduling line.    Raina Miranda RN  ....................  12/3/2020   12:28 PM      "

## 2020-12-03 NOTE — NURSING NOTE
"Chief Complaint   Patient presents with     Recheck Medication         Initial There were no vitals taken for this visit. Estimated body mass index is 32.61 kg/m  as calculated from the following:    Height as of 4/29/20: 1.613 m (5' 3.5\").    Weight as of 4/29/20: 84.8 kg (187 lb).    Medication Reconciliation: complete      Norma J. Gosselin, LPN  "

## 2020-12-03 NOTE — TELEPHONE ENCOUNTER
Pt is requesting a refill on her prozac. Pharmacy is [facility]. She states pharmacy did send over    Jessika Valverde on 12/3/2020 at 12:17 PM

## 2020-12-04 ASSESSMENT — ASTHMA QUESTIONNAIRES: ACT_TOTALSCORE: 18

## 2020-12-18 DIAGNOSIS — E78.2 MIXED HYPERLIPIDEMIA: ICD-10-CM

## 2020-12-18 RX ORDER — ATORVASTATIN CALCIUM 40 MG/1
40 TABLET, FILM COATED ORAL DAILY
Qty: 30 TABLET | Refills: 0 | Status: SHIPPED | OUTPATIENT
Start: 2020-12-18 | End: 2021-01-22

## 2020-12-18 NOTE — TELEPHONE ENCOUNTER
"Requested Prescriptions   Pending Prescriptions Disp Refills     atorvastatin (LIPITOR) 40 MG tablet 30 tablet 0     Sig: Take 1 tablet (40 mg) by mouth daily       Statins Protocol Failed - 12/18/2020  8:40 AM        Failed - LDL on file in past 12 months     Recent Labs   Lab Test 03/31/19  1319   LDL Cannot estimate LDL when triglyceride exceeds 400 mg/dL             Passed - No abnormal creatine kinase in past 12 months     No lab results found.             Passed - Recent (12 mo) or future (30 days) visit within the authorizing provider's specialty     Patient has had an office visit with the authorizing provider or a provider within the authorizing providers department within the previous 12 mos or has a future within next 30 days. See \"Patient Info\" tab in inbasket, or \"Choose Columns\" in Meds & Orders section of the refill encounter.              Passed - Medication is active on med list        Passed - Patient is age 18 or older        Passed - No active pregnancy on record        Passed - No positive pregnancy test in past 12 months               Last Written Prescription Date:  11/06/2020  Last Fill Quantity: 30,   # refills: 0  Last Office Visit: 12/03/2020 with Roxanne Celis DO Lipid panel was ordered on 11/06/2020 has not be done at this time.  Future Office visit:   None noted.  Unable to complete prescription refill per RN medication refill policy. Will route to provider for review and consideration.  Lulu Gomez RN on 12/18/2020 at 1:52 PM              "

## 2020-12-24 DIAGNOSIS — E11.65 TYPE 2 DIABETES MELLITUS WITH HYPERGLYCEMIA, UNSPECIFIED WHETHER LONG TERM INSULIN USE (H): Primary | ICD-10-CM

## 2020-12-24 RX ORDER — GLIPIZIDE AND METFORMIN HCL 5; 500 MG/1; MG/1
1 TABLET, FILM COATED ORAL
Qty: 60 TABLET | Refills: 0 | Status: SHIPPED | OUTPATIENT
Start: 2020-12-24 | End: 2021-08-02

## 2020-12-24 NOTE — TELEPHONE ENCOUNTER
[facility] sent Rx request for the following:      Requested Prescriptions   Pending Prescriptions Disp Refills   glipiZIDE-metFORMIN (METAGLIP) 5-500 MG tablet 180 tablet     Sig: Take 1 tablet by mouth 2 times daily (before meals)   Last Prescription Date:   4/29/20  Last Fill Qty/Refills:         180, R-1    Last Office Visit:              12/3/20  Future Office visit:           None  Routing refill request to provider for review/approval because:   Combination Oral Antihyperglycemic Agents Failed - 12/24/2020  9:11 AM       Failed - Patient has documented A1c within the specified period of time.    If HgbA1C is 8 or greater, it needs to be on file within the past 3 months.  If less than 8, must be on file within the past 6 months.     Recent Labs   Lab Test 07/03/19  1520   A1C 7.4*            Failed - Patient's CR is NOT>1.4 OR Patient's EGFR is NOT<45 within past 12 mos.     Unable to complete prescription refill per RN Medication Refill Policy. Wendy Evans RN .............. 12/24/2020  9:16 AM

## 2021-01-22 DIAGNOSIS — E11.65 TYPE 2 DIABETES MELLITUS WITH HYPERGLYCEMIA, UNSPECIFIED WHETHER LONG TERM INSULIN USE (H): ICD-10-CM

## 2021-01-22 DIAGNOSIS — E78.2 MIXED HYPERLIPIDEMIA: Primary | ICD-10-CM

## 2021-01-22 RX ORDER — ATORVASTATIN CALCIUM 40 MG/1
TABLET, FILM COATED ORAL
Qty: 90 TABLET | Refills: 0 | Status: SHIPPED | OUTPATIENT
Start: 2021-01-22 | End: 2021-04-20

## 2021-01-22 RX ORDER — GLIPIZIDE AND METFORMIN HCL 5; 500 MG/1; MG/1
TABLET, FILM COATED ORAL
Qty: 60 TABLET | Refills: 0 | OUTPATIENT
Start: 2021-01-22

## 2021-01-22 NOTE — TELEPHONE ENCOUNTER
[facility] sent Rx request for the following:      Requested Prescriptions   Pending Prescriptions Disp Refills   atorvastatin (LIPITOR) 40 MG tablet [Pharmacy Med Name: ATORVASTATIN 40MG TABLETS] 30 tablet 0    Sig: TAKE 1 TABLET(40 MG) BY MOUTH DAILY   Last Prescription Date:   12/18/20  Last Fill Qty/Refills:         30, R-0    Last Office Visit:              12/3/20  Future Office visit:           None  Routing refill request to provider for review/approval because:   Statins Protocol Failed - 1/22/2021  4:09 PM       Failed - LDL on file in past 12 months     Patient is overdue for annual exam. Reminder letter sent to Pt on 1/20/21. Previously given limited refill by Dr. Celis.    Unable to complete prescription refill per RN Medication Refill Policy. Wendy Evans RN .............. 1/22/2021  4:11 PM

## 2021-01-22 NOTE — TELEPHONE ENCOUNTER
Routing refill request to provider for review/approval because:  Junie given x1 and patient did not follow up, please advise    LOV: 12/3/2020    Called patient to inform due for annual review , patient states she does not need refill at this time.   Patient states will need to check when someone could drive her in for her appointment, will call back to schedule.    Jordyn Jiménez RN on 1/22/2021 at 12:22 PM

## 2021-02-17 DIAGNOSIS — Z79.4 ENCOUNTER FOR LONG-TERM (CURRENT) USE OF INSULIN (H): ICD-10-CM

## 2021-02-17 RX ORDER — FLURBIPROFEN SODIUM 0.3 MG/ML
SOLUTION/ DROPS OPHTHALMIC
Qty: 100 EACH | Refills: 0 | Status: SHIPPED | OUTPATIENT
Start: 2021-02-17 | End: 2021-11-15

## 2021-02-17 NOTE — TELEPHONE ENCOUNTER
[facility] sent Rx request for the following:      B-D PEN NDL MINI [id number](3/16)PRPL  Sig: USE 1 PEN NEEDLE DAILY OR AS DIRECTED      Last Prescription Date:   9/29/2020  Last Fill Qty/Refills:         100, R-0    Last Office Visit:              12/3/2020   Future Office visit:           none    Prescription refilled per RN Medication Refill Policy.................... Ramon Robledo RN ....................  2/17/2021   12:11 PM             The patient is a 78y Male complaining of

## 2021-02-26 ENCOUNTER — PATIENT OUTREACH (OUTPATIENT)
Dept: FAMILY MEDICINE | Facility: OTHER | Age: 46
End: 2021-02-26

## 2021-02-26 ASSESSMENT — PATIENT HEALTH QUESTIONNAIRE - PHQ9: SUM OF ALL RESPONSES TO PHQ QUESTIONS 1-9: 0

## 2021-02-26 NOTE — TELEPHONE ENCOUNTER
Patient Quality Outreach 2nd Attempt      Summary:    Type of outreach:    Phone, spoke to patient/parent. Patient she answered ACT and PHQ-9    Next Steps:  Reach out within 90 days via Phone.    Max number of attempts reached: No. Will try again in 90 days if patient still on fail list.    Questions for provider review:    None                                                                                                                    Norma J. Gosselin, LPN .......  2/26/2021  9:53 AM         Chart routed to Care Team.

## 2021-02-27 ASSESSMENT — ASTHMA QUESTIONNAIRES: ACT_TOTALSCORE: 21

## 2021-03-19 DIAGNOSIS — I10 ESSENTIAL HYPERTENSION: Primary | ICD-10-CM

## 2021-03-19 NOTE — TELEPHONE ENCOUNTER
[facility] sent Rx request for the following:      Requested Prescriptions   Pending Prescriptions Disp Refills   lisinopril (ZESTRIL) 20 MG tablet [Pharmacy Med Name: LISINOPRIL 20MG TABLETS] 90 tablet 1    Sig: TAKE 1 TABLET(20 MG) BY MOUTH DAILY   Last Prescription Date:   4/29/20  Last Fill Qty/Refills:         90, R-1    Last Office Visit:              12/3/20  Future Office visit:           None.  Routing refill request to provider for review/approval because:   ACE Inhibitors (Including Combos) Protocol Failed - 3/19/2021  4:45 PM       Failed - Blood pressure under 140/90 in past 12 months       Failed - Normal serum creatinine on file in past 12 months       Failed - Normal serum potassium on file in past 12 months   Break in medication.    Per LOV Note, Pt was instructed to follow up for annual exam. Reminder letter sent to Pt on 1/20/21. Called and spoke to Patient after verifying last name and date of birth. Pt reminded of this information and she noted she doesn't have transportation and was routed to scheduling to set up another virtual visit with Dr. Celis:    3/25/2021 3:00 PM Roxanne Celis,  [facility] [phone] [facility]     Unable to complete prescription refill per RN Medication Refill Policy. Wendy Evans RN .............. 3/22/2021  9:56 AM

## 2021-03-22 RX ORDER — LISINOPRIL 20 MG/1
TABLET ORAL
Qty: 30 TABLET | Refills: 0 | Status: SHIPPED | OUTPATIENT
Start: 2021-03-22 | End: 2021-04-20

## 2021-03-25 ENCOUNTER — VIRTUAL VISIT (OUTPATIENT)
Dept: FAMILY MEDICINE | Facility: OTHER | Age: 46
End: 2021-03-25
Attending: FAMILY MEDICINE
Payer: COMMERCIAL

## 2021-03-25 DIAGNOSIS — J45.20 MILD INTERMITTENT ASTHMA WITHOUT COMPLICATION: ICD-10-CM

## 2021-03-25 DIAGNOSIS — E78.2 MIXED HYPERLIPIDEMIA: ICD-10-CM

## 2021-03-25 DIAGNOSIS — E11.65 TYPE 2 DIABETES MELLITUS WITH HYPERGLYCEMIA, UNSPECIFIED WHETHER LONG TERM INSULIN USE (H): Primary | ICD-10-CM

## 2021-03-25 DIAGNOSIS — I10 ESSENTIAL HYPERTENSION: ICD-10-CM

## 2021-03-25 PROCEDURE — 99442 PR PHYSICIAN TELEPHONE EVALUATION 11-20 MIN: CPT | Performed by: FAMILY MEDICINE

## 2021-03-25 ASSESSMENT — PAIN SCALES - GENERAL: PAINLEVEL: NO PAIN (0)

## 2021-03-25 ASSESSMENT — ENCOUNTER SYMPTOMS
FEVER: 0
CHILLS: 0

## 2021-03-25 NOTE — NURSING NOTE
Patient is needing visit today for medication review.    Medication Reconciliation Complete    Leticia Arauz LPN  3/25/2021 3:00 PM

## 2021-03-25 NOTE — PROGRESS NOTES
"Beatriz is a 45 year old who is being evaluated via a billable telephone visit.      What phone number would you like to be contacted at? [phone]  How would you like to obtain your AVS?     Assessment & Plan     Type 2 diabetes mellitus with hyperglycemia, unspecified whether long term insulin use (H)  Hgb A1c, urine microalbumin/creatinine ration, and TSH ordered.  Encouraged her to complete her diabetic eye exam.  Continue current medication regimen.  Adjustments pending lab results.  - TSH Reflex GH; Future  - Hemoglobin A1c; Future  - Albumin Random Urine Quantitative with Creat Ratio; Future    Essential hypertension  BP at goal < 130/80.  Continue to monitor weekly.  If persistently elevated beyond goal, will require medication adjustments.  Check CBC and CMP for laboratory monitoring.  Continue Lisinopril.  - CBC and Differential; Future  - Comprehensive Metabolic Panel; Future    Mixed hyperlipidemia  Check lipid panel to assess cholesterol control.  Continue Atorvastatin.  Adjustments pending lab results.  - Lipid Panel; Future    Mild intermittent asthma without complication  Well-controlled on current regimen.  Continue Albuterol inhaler.    Recommend follow-up in clinic for annual physical.    Roxanne Celis, [facility]    Subjective   Beatriz is a 45 year old who presents for the following health issues:    HPI   Diabetes Mellitus:  Last Hemoglobin A1c was 7.4% in 2019.  She checks her morning fasting blood sugar about every other day.  She reports that it is typically under 120.  She has had episodes of low blood sugar 1-2 times per month during which she experiences \"shakiness.\"  This resolves with orange juice.  Episodes are typically provoked by her eating off her normal schedule.    Hypertension:  She checks her blood pressure once per week at her mother-in-law's.  She reports values that typically range from 117-120 mmHg systolic and 87-94 mmHg diastolic.  She had one " elevation to 170's mmHg last week which was abnormal.  She has been taking Lisinopril as prescribed without complications.    Hyperlipidemia:  No issues on Atorvastatin.    Asthma:  Reports that her asthma is well-controlled on albuterol inhaler.      Review of Systems   Constitutional: Negative for chills and fever.          Objective    Vitals - Patient Reported  Pain Score: No Pain (0)  Physical Exam   alert and no distress  PSYCH: Alert and oriented times 3; coherent speech, normal   rate and volume, able to articulate logical thoughts, able   to abstract reason, no tangential thoughts, no hallucinations   or delusions  Her affect is normal  RESP: No cough, no audible wheezing, able to talk in full sentences  Remainder of exam unable to be completed due to telephone visits      Phone call duration: 13 minutes

## 2021-04-09 ENCOUNTER — PATIENT OUTREACH (OUTPATIENT)
Dept: FAMILY MEDICINE | Facility: OTHER | Age: 46
End: 2021-04-09

## 2021-04-09 NOTE — LETTER
[facility]  [address]  [place] [id number] [phone]       April 9, 2021    Beatriz Leal  [address]  [place] [address]    Dear Beatriz,    We care about your health and have reviewed your health plan and are making recommendations based on this review, to optimize your health.  Please schedule a lab only appointment.    You are in particular need of attention regarding:  -Diabetes  -Wellness (Physical) Visit   -lab only appointment    We are recommending that you:  -schedule a LAB ONLY APPOINTMENT to recheck your: A1c, Lipids (fasting cholesterol - nothing to eat except water and/or meds for 8+ hours), BMP (basic metabolic panel) and Microablumin tests within the next 1-4 weeks.    -schedule a FOLLOWUP OFFICE APPOINTMENT with Roxanne Celis.       -schedule a WELLNESS (Physical) APPOINTMENT with Roxanne Celis.        -schedule a MAMMOGRAM which is due.    1 in 8 women will develop invasive breast cancer during her lifetime and it is the most common non-skin cancer in American women.  EARLY detection, new treatments, and a better understanding of the disease have increased survival rates - the 5 year survival rate in the 1960s was 63% and today it is close to 90%.    If you are under/uninsured, we recommend you contact the [facility]. They offer mammograms at no charge or on a sliding fee charge. You can schedule with them at [phone]. Please have them send us the results.      Please disregard this reminder if you have had this exam elsewhere within the last year.  It would be helpful for us to have a copy of your mammogram report in your file so that we can best coordinate your care - please contact us with when your test was done so we can update your record.             -Diabetic Eye Exam is due.  If this was done within the last 12 months then please contact the clinic with the date and location so we can update your records.    In  addition, here is a list of due or overdue Health Maintenance reminders.    Health Maintenance Due   Topic Date Due     Preventive Care Visit  Never done     Discuss Advance Care Planning  Never done     Eye Exam  Never done     Mammogram  Never done     Pneumococcal Vaccine (1 of 2 - PPSV23) Never done     HIV Screening  Never done     COVID-19 Vaccine (1) Never done     Hepatitis C Screening  Never done     Hepatitis B Vaccine (1 of 3 - Risk 3-dose series) Never done     PAP Smear  Never done     Diptheria Tetanus Pertussis (DTAP/TDAP/TD) Vaccine (1 - Tdap) Never done     A1C Lab  01/03/2020     Basic Metabolic Panel  03/31/2020     Cholesterol Lab  03/31/2020     Asthma Action Plan - yearly  04/04/2020     Kidney Microalbumin Urine Test  07/03/2020     Diabetic Foot Exam  07/03/2020     Flu Vaccine (1) 09/01/2020       To address the above recommendations, we encourage you to contact us at [phone]. They will assist you with finding the most convenient time and location.    Thank you for trusting [facility] and we appreciate the opportunity to serve you.  We look forward to supporting your healthcare needs in the future.    Healthy Regards,    Your [facility] Team

## 2021-04-09 NOTE — TELEPHONE ENCOUNTER
Patient Quality Outreach      Summary:    Patient has the following on her problem list/HM:   Diabetes    Last A1C:  Lab Results   Component Value Date    A1C 7.4 07/03/2019    A1C 13.6 03/31/2019       Last LDL:    Lab Results   Component Value Date    LDL  03/31/2019     Cannot estimate LDL when triglyceride exceeds 400 mg/dL       Is the patient on a Statin? Yes          Is the patient on Aspirin? No    Medications     HMG CoA Reductase Inhibitors     atorvastatin (LIPITOR) 40 MG tablet             Last three blood pressure readings:  BP Readings from Last 3 Encounters:   07/03/19 136/80   04/25/19 144/76   04/04/19 118/64            Tobacco Use      Smoking status: Current Every Day Smoker        Packs/day: 1.00        Years: 30.00        Pack years: 30        Types: Cigarettes      Smokeless tobacco: Never Used          Patient is due/failing the following:   A1C, LDL (Fasting), Eye Exam, Microalbumin, Statin and LDL (Fasting), A1C and Microalbumin and Adult/Adolescent physical, date due: now    Type of outreach:    Sent letter.    Questions for provider review:    None                                                                                                                                     Norma J. Gosselin, LPN       Chart routed to Care Team.

## 2021-04-18 DIAGNOSIS — E78.2 MIXED HYPERLIPIDEMIA: Primary | ICD-10-CM

## 2021-04-18 DIAGNOSIS — I10 ESSENTIAL HYPERTENSION: ICD-10-CM

## 2021-04-20 NOTE — TELEPHONE ENCOUNTER
[facility] sent Rx request for the following:      Requested Prescriptions   Pending Prescriptions Disp Refills   atorvastatin (LIPITOR) 40 MG tablet [Pharmacy Med Name: ATORVASTATIN 40MG TABLETS] 90 tablet 0    Sig: TAKE 1 TABLET(40 MG) BY MOUTH DAILY   Last Prescription Date:   1/22/21  Last Fill Qty/Refills:         90, R-0    Routing refill request to provider for review/approval because:   Statins Protocol Failed - 4/20/2021  3:51 PM       Failed - LDL on file in past 12 months      lisinopril (ZESTRIL) 20 MG tablet [Pharmacy Med Name: LISINOPRIL 20MG TABLETS] 30 tablet 0    Sig: TAKE 1 TABLET(20 MG) BY MOUTH DAILY   Last Prescription Date:   3/22/21  Last Fill Qty/Refills:         30, R-0    Routing refill request to provider for review/approval because:   ACE Inhibitors (Including Combos) Protocol Failed - 4/20/2021  3:51 PM       Failed - Blood pressure under 140/90 in past 12 months       Failed - Normal serum creatinine on file in past 12 months       Failed - Normal serum potassium on file in past 12 months     Last Office Visit:              3/25/21  Future Office visit:           None    Unable to complete prescription refill per RN Medication Refill Policy. Wendy Evans RN .............. 4/20/2021  3:59 PM

## 2021-04-21 RX ORDER — LISINOPRIL 20 MG/1
TABLET ORAL
Qty: 90 TABLET | Refills: 0 | Status: SHIPPED | OUTPATIENT
Start: 2021-04-21 | End: 2021-07-20

## 2021-04-21 RX ORDER — ATORVASTATIN CALCIUM 40 MG/1
TABLET, FILM COATED ORAL
Qty: 90 TABLET | Refills: 0 | Status: SHIPPED | OUTPATIENT
Start: 2021-04-21

## 2021-05-13 ENCOUNTER — OFFICE VISIT (OUTPATIENT)
Dept: FAMILY MEDICINE | Facility: OTHER | Age: 46
End: 2021-05-13
Attending: FAMILY MEDICINE
Payer: COMMERCIAL

## 2021-05-13 VITALS
OXYGEN SATURATION: 98 % | DIASTOLIC BLOOD PRESSURE: 78 MMHG | TEMPERATURE: 97.1 F | RESPIRATION RATE: 16 BRPM | SYSTOLIC BLOOD PRESSURE: 134 MMHG | HEIGHT: 64 IN | BODY MASS INDEX: 33.53 KG/M2 | HEART RATE: 70 BPM | WEIGHT: 196.4 LBS

## 2021-05-13 DIAGNOSIS — R11.2 NON-INTRACTABLE VOMITING WITH NAUSEA, UNSPECIFIED VOMITING TYPE: ICD-10-CM

## 2021-05-13 DIAGNOSIS — Z11.59 NEED FOR HEPATITIS C SCREENING TEST: ICD-10-CM

## 2021-05-13 DIAGNOSIS — I10 ESSENTIAL HYPERTENSION: ICD-10-CM

## 2021-05-13 DIAGNOSIS — Z12.31 ENCOUNTER FOR SCREENING MAMMOGRAM FOR BREAST CANCER: ICD-10-CM

## 2021-05-13 DIAGNOSIS — F31.9 BIPOLAR DEPRESSION (H): ICD-10-CM

## 2021-05-13 DIAGNOSIS — E78.2 MIXED HYPERLIPIDEMIA: ICD-10-CM

## 2021-05-13 DIAGNOSIS — E11.65 TYPE 2 DIABETES MELLITUS WITH HYPERGLYCEMIA, UNSPECIFIED WHETHER LONG TERM INSULIN USE (H): ICD-10-CM

## 2021-05-13 DIAGNOSIS — Z00.00 ANNUAL PHYSICAL EXAM: Primary | ICD-10-CM

## 2021-05-13 LAB
ALBUMIN SERPL-MCNC: 3.8 G/DL (ref 3.5–5.7)
ALP SERPL-CCNC: 75 U/L (ref 34–104)
ALT SERPL W P-5'-P-CCNC: 13 U/L (ref 7–52)
ANION GAP SERPL CALCULATED.3IONS-SCNC: 7 MMOL/L (ref 3–14)
AST SERPL W P-5'-P-CCNC: 12 U/L (ref 13–39)
BASOPHILS # BLD AUTO: 0.2 10E9/L (ref 0–0.2)
BASOPHILS NFR BLD AUTO: 1.4 %
BILIRUB SERPL-MCNC: 0.4 MG/DL (ref 0.3–1)
BUN SERPL-MCNC: 17 MG/DL (ref 7–25)
CALCIUM SERPL-MCNC: 9.5 MG/DL (ref 8.6–10.3)
CHLORIDE SERPL-SCNC: 105 MMOL/L (ref 98–107)
CHOLEST SERPL-MCNC: 144 MG/DL
CO2 SERPL-SCNC: 28 MMOL/L (ref 21–31)
CREAT SERPL-MCNC: 0.85 MG/DL (ref 0.6–1.2)
DIFFERENTIAL METHOD BLD: ABNORMAL
EOSINOPHIL # BLD AUTO: 0.4 10E9/L (ref 0–0.7)
EOSINOPHIL NFR BLD AUTO: 3.2 %
ERYTHROCYTE [DISTWIDTH] IN BLOOD BY AUTOMATED COUNT: 13.3 % (ref 10–15)
GFR SERPL CREATININE-BSD FRML MDRD: 72 ML/MIN/{1.73_M2}
GLUCOSE SERPL-MCNC: 119 MG/DL (ref 70–105)
HBA1C MFR BLD: 8.9 % (ref 4–6)
HCT VFR BLD AUTO: 38.2 % (ref 35–47)
HDLC SERPL-MCNC: 37 MG/DL (ref 23–92)
HGB BLD-MCNC: 13.3 G/DL (ref 11.7–15.7)
IMM GRANULOCYTES # BLD: 0 10E9/L (ref 0–0.4)
IMM GRANULOCYTES NFR BLD: 0.4 %
LDLC SERPL CALC-MCNC: 52 MG/DL
LYMPHOCYTES # BLD AUTO: 3.1 10E9/L (ref 0.8–5.3)
LYMPHOCYTES NFR BLD AUTO: 27.8 %
MCH RBC QN AUTO: 31.7 PG (ref 26.5–33)
MCHC RBC AUTO-ENTMCNC: 34.8 G/DL (ref 31.5–36.5)
MCV RBC AUTO: 91 FL (ref 78–100)
MONOCYTES # BLD AUTO: 0.7 10E9/L (ref 0–1.3)
MONOCYTES NFR BLD AUTO: 6.1 %
NEUTROPHILS # BLD AUTO: 6.9 10E9/L (ref 1.6–8.3)
NEUTROPHILS NFR BLD AUTO: 61.1 %
NONHDLC SERPL-MCNC: 107 MG/DL
PLATELET # BLD AUTO: 397 10E9/L (ref 150–450)
POTASSIUM SERPL-SCNC: 4.4 MMOL/L (ref 3.5–5.1)
PROT SERPL-MCNC: 6.4 G/DL (ref 6.4–8.9)
RBC # BLD AUTO: 4.19 10E12/L (ref 3.8–5.2)
SODIUM SERPL-SCNC: 140 MMOL/L (ref 134–144)
TRIGL SERPL-MCNC: 276 MG/DL
TSH SERPL DL<=0.05 MIU/L-ACNC: 2.78 IU/ML (ref 0.34–5.6)
WBC # BLD AUTO: 11.2 10E9/L (ref 4–11)

## 2021-05-13 PROCEDURE — 85025 COMPLETE CBC W/AUTO DIFF WBC: CPT | Mod: ZL | Performed by: FAMILY MEDICINE

## 2021-05-13 PROCEDURE — 86803 HEPATITIS C AB TEST: CPT | Mod: ZL | Performed by: FAMILY MEDICINE

## 2021-05-13 PROCEDURE — 36415 COLL VENOUS BLD VENIPUNCTURE: CPT | Mod: ZL | Performed by: FAMILY MEDICINE

## 2021-05-13 PROCEDURE — 99396 PREV VISIT EST AGE 40-64: CPT | Performed by: FAMILY MEDICINE

## 2021-05-13 PROCEDURE — G0463 HOSPITAL OUTPT CLINIC VISIT: HCPCS

## 2021-05-13 PROCEDURE — 83036 HEMOGLOBIN GLYCOSYLATED A1C: CPT | Mod: ZL | Performed by: FAMILY MEDICINE

## 2021-05-13 PROCEDURE — 80053 COMPREHEN METABOLIC PANEL: CPT | Mod: ZL | Performed by: FAMILY MEDICINE

## 2021-05-13 PROCEDURE — 99214 OFFICE O/P EST MOD 30 MIN: CPT | Mod: 25 | Performed by: FAMILY MEDICINE

## 2021-05-13 PROCEDURE — 84443 ASSAY THYROID STIM HORMONE: CPT | Mod: ZL | Performed by: FAMILY MEDICINE

## 2021-05-13 PROCEDURE — 80061 LIPID PANEL: CPT | Mod: ZL | Performed by: FAMILY MEDICINE

## 2021-05-13 ASSESSMENT — MIFFLIN-ST. JEOR: SCORE: 1515.86

## 2021-05-13 ASSESSMENT — PATIENT HEALTH QUESTIONNAIRE - PHQ9
5. POOR APPETITE OR OVEREATING: SEVERAL DAYS
SUM OF ALL RESPONSES TO PHQ QUESTIONS 1-9: 6

## 2021-05-13 ASSESSMENT — ANXIETY QUESTIONNAIRES
5. BEING SO RESTLESS THAT IT IS HARD TO SIT STILL: SEVERAL DAYS
2. NOT BEING ABLE TO STOP OR CONTROL WORRYING: SEVERAL DAYS
6. BECOMING EASILY ANNOYED OR IRRITABLE: NOT AT ALL
IF YOU CHECKED OFF ANY PROBLEMS ON THIS QUESTIONNAIRE, HOW DIFFICULT HAVE THESE PROBLEMS MADE IT FOR YOU TO DO YOUR WORK, TAKE CARE OF THINGS AT HOME, OR GET ALONG WITH OTHER PEOPLE: SOMEWHAT DIFFICULT
7. FEELING AFRAID AS IF SOMETHING AWFUL MIGHT HAPPEN: NOT AT ALL
1. FEELING NERVOUS, ANXIOUS, OR ON EDGE: MORE THAN HALF THE DAYS
3. WORRYING TOO MUCH ABOUT DIFFERENT THINGS: SEVERAL DAYS
GAD7 TOTAL SCORE: 6

## 2021-05-13 ASSESSMENT — PAIN SCALES - GENERAL: PAINLEVEL: NO PAIN (0)

## 2021-05-13 NOTE — NURSING NOTE
"Chief Complaint   Patient presents with     Physical         Initial /78   Pulse 70   Temp 97.1  F (36.2  C) (Temporal)   Resp 16   Ht 1.626 m (5' 4\")   Wt 89.1 kg (196 lb 6.4 oz)   LMP 05/10/2021   SpO2 98%   BMI 33.71 kg/m   Estimated body mass index is 33.71 kg/m  as calculated from the following:    Height as of this encounter: 1.626 m (5' 4\").    Weight as of this encounter: 89.1 kg (196 lb 6.4 oz).         Medication Reconciliation: Complete      Norma J. Gosselin, LPN   "
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
## 2021-05-14 ASSESSMENT — ASTHMA QUESTIONNAIRES: ACT_TOTALSCORE: 23

## 2021-05-14 ASSESSMENT — ANXIETY QUESTIONNAIRES: GAD7 TOTAL SCORE: 6

## 2021-05-16 LAB — HCV AB SERPL QL IA: NONREACTIVE

## 2021-05-21 DIAGNOSIS — E11.65 TYPE 2 DIABETES MELLITUS WITH HYPERGLYCEMIA (H): Primary | ICD-10-CM

## 2021-05-21 RX ORDER — INSULIN GLARGINE 100 [IU]/ML
INJECTION, SOLUTION SUBCUTANEOUS
Qty: 30 ML | Refills: 1 | Status: SHIPPED | OUTPATIENT
Start: 2021-05-21 | End: 2022-06-16

## 2021-05-21 NOTE — TELEPHONE ENCOUNTER
[facility] sent Rx request for the following:      Requested Prescriptions   Pending Prescriptions Disp Refills   insulin glargine (BASAGLAR KWIKPEN) 100 UNIT/ML pen [Pharmacy Med Name: BASAGLAR 100 U/ML KWIKPEN INJ 3ML] 3 mL 11    Sig: ADMINISTER 19 UNITS UNDER THE SKIN AT BEDTIME   Last Prescription Date:   4/29/20  Last Fill Qty/Refills:         3 ml, R-11    Last Office Visit:              5/13/21   Future Office visit:           None    Prescription approved per [facility] Refill Protocol. Wendy Evans RN .............. 5/21/2021  10:47 AM

## 2021-06-11 ENCOUNTER — PATIENT OUTREACH (OUTPATIENT)
Dept: FAMILY MEDICINE | Facility: OTHER | Age: 46
End: 2021-06-11

## 2021-06-11 NOTE — TELEPHONE ENCOUNTER
Patient Quality Outreach      Summary:    Patient has the following on her problem list/HM: None    Patient is due/failing the following:   Eye Exam    Type of outreach:    Sent letter.    Questions for provider review:    None                                                                                                                                     Norma J. Gosselin, LPN       Chart routed to Care Team.

## 2021-06-11 NOTE — LETTER
June 11, 2021      Beatriz Leal  [address]  [place] [address]        Dear Beatriz,     June 11, 2021      Beatriz Leal  [address]  [place] [address]      Your healthcare team cares about your health. To provide you with the best care,   we have reviewed your chart and based on our findings, we see that you are due to:     - DIABETES FOLLOW UP: Schedule a DIABETIC EYE EXAM.  This exam is done with an optometrist. You can schedule this appointment with your eye doctor.  If you need a referral, please let us know.    If you have already completed these items, please contact the clinic via phone or   [employer] so your care team can review and update your records. Thank you for   choosing [facility] for your healthcare needs. For any questions,   concerns, or to schedule an appointment please contact the clinic.       Healthy Regards,      Your [facility] Care Team          Enclosure: N/A

## 2021-07-17 DIAGNOSIS — I10 ESSENTIAL HYPERTENSION: ICD-10-CM

## 2021-07-20 RX ORDER — LISINOPRIL 20 MG/1
TABLET ORAL
Qty: 90 TABLET | Refills: 2 | Status: SHIPPED | OUTPATIENT
Start: 2021-07-20 | End: 2022-04-04

## 2021-07-20 NOTE — TELEPHONE ENCOUNTER
"Requested Prescriptions   Pending Prescriptions Disp Refills     lisinopril (ZESTRIL) 20 MG tablet [Pharmacy Med Name: LISINOPRIL 20MG TABLETS] 90 tablet 0     Sig: TAKE 1 TABLET(20 MG) BY MOUTH DAILY       ACE Inhibitors (Including Combos) Protocol Passed - 7/17/2021  5:24 AM        Passed - Blood pressure under 140/90 in past 12 months     BP Readings from Last 3 Encounters:   05/13/21 134/78   07/03/19 136/80   04/25/19 144/76                 Passed - Recent (12 mo) or future (30 days) visit within the authorizing provider's specialty     Patient has had an office visit with the authorizing provider or a provider within the authorizing providers department within the previous 12 mos or has a future within next 30 days. See \"Patient Info\" tab in inbasket, or \"Choose Columns\" in Meds & Orders section of the refill encounter.              Passed - Medication is active on med list        Passed - Patient is age 18 or older        Passed - No active pregnancy on record        Passed - Normal serum creatinine on file in past 12 months     Recent Labs   Lab Test 05/13/21  1549   CR 0.85       Ok to refill medication if creatinine is low          Passed - Normal serum potassium on file in past 12 months     Recent Labs   Lab Test 05/13/21  1549   POTASSIUM 4.4             Passed - No positive pregnancy test within past 12 months           LOV 5/13/21 [place]  Prescription approved per [facility] Refill Protocol.  Brenda J. Goodell, RN on 7/20/2021 at 8:46 AM    "

## 2021-07-31 DIAGNOSIS — E11.65 TYPE 2 DIABETES MELLITUS WITH HYPERGLYCEMIA, UNSPECIFIED WHETHER LONG TERM INSULIN USE (H): ICD-10-CM

## 2021-08-02 NOTE — TELEPHONE ENCOUNTER
Requested Prescriptions   Pending Prescriptions Disp Refills     glipiZIDE-metFORMIN (METAGLIP) 5-500 MG tablet [Pharmacy Med Name: GLIPIZIDE-METFORMIN 5MG/500MG TABS] 60 tablet 0     Sig: TAKE 1 TABLET BY MOUTH TWICE DAILY BEFORE MEALS     Last Written Prescription Date:  12/24/20 [place]  Last Fill Quantity: 60,   # refills: 0  [facility]  Future Office visit:   none    Routing refill request to provider for review/approval because:  New RN protocol.  Brenda J. Goodell, RN on 8/2/2021 at 3:32 PM

## 2021-08-04 RX ORDER — GLIPIZIDE AND METFORMIN HCL 5; 500 MG/1; MG/1
TABLET, FILM COATED ORAL
Qty: 120 TABLET | Refills: 0 | Status: SHIPPED | OUTPATIENT
Start: 2021-08-04 | End: 2022-06-16

## 2021-11-11 ENCOUNTER — HOSPITAL ENCOUNTER (EMERGENCY)
Facility: OTHER | Age: 46
Discharge: LEFT BEFORE DISCHARGE | End: 2021-11-11
Payer: COMMERCIAL

## 2021-11-15 DIAGNOSIS — Z79.4 ENCOUNTER FOR LONG-TERM (CURRENT) USE OF INSULIN (H): ICD-10-CM

## 2021-11-15 RX ORDER — FLURBIPROFEN SODIUM 0.3 MG/ML
SOLUTION/ DROPS OPHTHALMIC
Qty: 100 EACH | Refills: 11 | Status: SHIPPED | OUTPATIENT
Start: 2021-11-15

## 2021-12-15 DIAGNOSIS — F31.9 BIPOLAR DEPRESSION (H): ICD-10-CM

## 2021-12-17 RX ORDER — FLUOXETINE 40 MG/1
CAPSULE ORAL
Qty: 90 CAPSULE | Refills: 3 | Status: SHIPPED | OUTPATIENT
Start: 2021-12-17 | End: 2022-12-29

## 2021-12-17 NOTE — TELEPHONE ENCOUNTER
"Roxanne Celis  Per your last office visit note re: 5. Bipolar depression (H) - \"Routine follow-up with mental health provider.\" No visits or appointments with psychiatry per [facility]. You last authorized Prozac for 1 year in December. Okay to refill as previously approved?    Last Prescription Date: 12/3/2020  Last Qty/Refills: 90 / 3  Last Office Visit: 5/13/2021  Future Office Visit: none     Requested Prescriptions   Pending Prescriptions Disp Refills     FLUoxetine (PROZAC) 40 MG capsule [Pharmacy Med Name: FLUOXETINE 40MG CAPSULES] 90 capsule 3     Sig: TAKE 1 CAPSULE(40 MG) BY MOUTH DAILY       SSRIs Protocol Passed - 12/15/2021  7:06 AM        Passed - Recent (12 mo) or future (30 days) visit within the authorizing provider's specialty     Patient has had an office visit with the authorizing provider or a provider within the authorizing providers department within the previous 12 mos or has a future within next 30 days. See \"Patient Info\" tab in inbasket, or \"Choose Columns\" in Meds & Orders section of the refill encounter.              Passed - Medication is active on med list        Passed - Patient is age 18 or older        Passed - No active pregnancy on record        Passed - No positive pregnancy test in last 12 months             "

## 2022-02-17 PROBLEM — E11.65 TYPE 2 DIABETES MELLITUS WITH HYPERGLYCEMIA (H): Status: ACTIVE | Noted: 2019-03-31

## 2022-04-02 DIAGNOSIS — I10 ESSENTIAL HYPERTENSION: ICD-10-CM

## 2022-04-04 RX ORDER — LISINOPRIL 20 MG/1
TABLET ORAL
Qty: 90 TABLET | Refills: 3 | Status: SHIPPED | OUTPATIENT
Start: 2022-04-04

## 2022-04-04 NOTE — TELEPHONE ENCOUNTER
[facility] [place] sent Rx request for the following:      Requested Prescriptions   Pending Prescriptions Disp Refills     lisinopril (ZESTRIL) 20 MG tablet [Pharmacy Med Name: LISINOPRIL 20MG TABLETS] 90 tablet 2     Sig: TAKE 1 TABLET(20 MG) BY MOUTH DAILY   Last Prescription Date:   7/20/21  Last Fill Qty/Refills:         90, R-2    Last Office Visit:              5/13/21  Future Office visit:           None    Wendy Evans RN .............. 4/4/2022  4:34 PM

## 2022-06-14 DIAGNOSIS — E11.65 TYPE 2 DIABETES MELLITUS WITH HYPERGLYCEMIA (H): ICD-10-CM

## 2022-06-14 DIAGNOSIS — E11.65 TYPE 2 DIABETES MELLITUS WITH HYPERGLYCEMIA, UNSPECIFIED WHETHER LONG TERM INSULIN USE (H): ICD-10-CM

## 2022-06-15 NOTE — TELEPHONE ENCOUNTER
"[id number]  sent Rx request for the following:      Requested Prescriptions   Pending Prescriptions Disp Refills     insulin glargine (BASAGLAR KWIKPEN) 100 UNIT/ML pen [Pharmacy Med Name: BASAGLAR 100 U/ML KWIKPEN INJ 3ML] 30 mL 1     Sig: INJECT 19 UNITS UNDER THE SKIN EVERY NIGHT AT BEDTIME       Long Acting Insulin Protocol Failed - 6/14/2022  3:14 PM        Failed - Serum creatinine on file in past 12 months     Recent Labs   Lab Test 05/13/21  1549   CR 0.85     Ok to refill medication if creatinine is low          Failed - HgbA1C in past 3 or 6 months     If HgbA1C is 8 or greater, it needs to be on file within the past 3 months.  If less than 8, must be on file within the past 6 months.     Recent Labs   Lab Test 05/13/21  1549   A1C 8.9*           Failed - Recent (6 mo) or future (30 days) visit within the authorizing provider's specialty     Patient had office visit in the last 6 months or has a visit in the next 30 days with authorizing provider or within the authorizing provider's specialty.  See \"Patient Info\" tab in inbasket, or \"Choose Columns\" in Meds & Orders section of the refill encounter.          Passed - Medication is active on med list        Passed - Patient is age 18 or older     Last Prescription Date:   05/21/21  Last Fill Qty/Refills:         30 mL, R-1          glipiZIDE-metFORMIN (METAGLIP) 5-500 MG tablet [Pharmacy Med Name: GLIPIZIDE-METFORMIN 5MG/500MG TABS] 120 tablet 0     Sig: TAKE 1 TABLET BY MOUTH TWICE DAILY BEFORE MEALS       Combination Oral Antihyperglycemic Agents Failed - 6/14/2022  3:14 PM        Failed - Patient has documented A1c within the specified period of time.     If HgbA1C is 8 or greater, it needs to be on file within the past 3 months.  If less than 8, must be on file within the past 6 months.     Recent Labs   Lab Test 05/13/21  1549   A1C 8.9*           Failed - Patient's CR is NOT>1.4 OR Patient's EGFR is NOT<45 within past 12 mos.     " "Recent Labs   Lab Test 05/13/21  1549   GFRESTIMATED 72   GFRESTBLACK 87     Recent Labs   Lab Test 05/13/21  1549   CR 0.85           Failed - Recent (6 mo) or future (30 days) visit within the authorizing provider's specialty     Patient had office visit in the last 6 months or has a visit in the next 30 days with authorizing provider or within the authorizing provider's specialty.  See \"Patient Info\" tab in inbasket, or \"Choose Columns\" in Meds & Orders section of the refill encounter.          Passed - Patient does not have a diagnosis of CHF.        Passed - Medication is active on med list        Passed - Patient is 18 years old or older.        Passed - Patient is not pregnant        Passed - Patient has not had a positive pregnancy test within the past 12 mos.     Last Prescription Date:   08/04/21  Last Fill Qty/Refills:         120, R-0  Last Office Visit:              05/13/21   Future Office visit:           None    Routing to  Outreach Appt Requests as patient is due for annual physical exam.     Angelika Pepper RN .............. 6/15/2022  2:10 PM       "

## 2022-06-16 RX ORDER — INSULIN GLARGINE 100 [IU]/ML
INJECTION, SOLUTION SUBCUTANEOUS
Qty: 9 ML | Refills: 0 | Status: SHIPPED | OUTPATIENT
Start: 2022-06-16

## 2022-06-16 RX ORDER — GLIPIZIDE AND METFORMIN HCL 5; 500 MG/1; MG/1
TABLET, FILM COATED ORAL
Qty: 60 TABLET | Refills: 0 | Status: SHIPPED | OUTPATIENT
Start: 2022-06-16 | End: 2022-07-21

## 2022-06-16 NOTE — TELEPHONE ENCOUNTER
Called patient to advise of Rx refill and to schedule annual physical. She states she will call back to schedule as she needs to get a ride in place as she doesn't have a car.     Miladys Muniz on 6/16/2022 at 1:34 PM

## 2022-07-18 DIAGNOSIS — E11.65 TYPE 2 DIABETES MELLITUS WITH HYPERGLYCEMIA, UNSPECIFIED WHETHER LONG TERM INSULIN USE (H): ICD-10-CM

## 2022-07-19 NOTE — TELEPHONE ENCOUNTER
[facility] sent Rx request for the following:      Requested Prescriptions   Pending Prescriptions Disp Refills     glipiZIDE-metFORMIN (METAGLIP) 5-500 MG tablet [Pharmacy Med Name: GLIPIZIDE-METFORMIN 5MG/500MG TABS] 60 tablet 0     Sig: TAKE 1 TABLET BY MOUTH TWICE DAILY BEFORE MEALS       Combination Oral Antihyperglycemic Agents Failed - 7/19/2022 10:36 AM        Failed - Patient has documented A1c within the specified period of time.     If HgbA1C is 8 or greater, it needs to be on file within the past 3 months.  If less than 8, must be on file within the past 6 months.     Recent Labs   Lab Test 05/13/21  1549   A1C 8.9*           Failed - Patient's CR is NOT>1.4 OR Patient's EGFR is NOT<45 within past 12 mos.     Recent Labs   Lab Test 05/13/21  1549   GFRESTIMATED 72   GFRESTBLACK 87       Recent Labs   Lab Test 05/13/21  1549   CR 0.85           Failed - Recent (6 mo) or future (30 days) visit within the authorizing provider's specialty     Last Prescription Date:   6/16/22  Last Fill Qty/Refills:         60, R-0    Last Office Visit:              5/13/21   Future Office visit:           None    Pt due for annual exam. Per refill encounter, dated 6/14/22, Pt was reminded and she will call back to schedule as she needs to get a ride in place and doesn't have a car.     Wendy Evans RN .............. 7/19/2022  10:37 AM

## 2022-07-21 RX ORDER — GLIPIZIDE AND METFORMIN HCL 5; 500 MG/1; MG/1
TABLET, FILM COATED ORAL
Qty: 180 TABLET | Refills: 0 | Status: SHIPPED | OUTPATIENT
Start: 2022-07-21 | End: 2022-10-12

## 2022-10-08 DIAGNOSIS — E11.65 TYPE 2 DIABETES MELLITUS WITH HYPERGLYCEMIA, UNSPECIFIED WHETHER LONG TERM INSULIN USE (H): ICD-10-CM

## 2022-10-08 NOTE — LETTER
October 11, 2022      Beatriz Leal  [address] [address]    This letter is to remind you that you are due for your annual exam with Roxanne Celis. Your last comprehensive visit was more than 12 months ago.    Please call the clinic at [phone] to schedule your appointment.    If you should require additional refills before your scheduled appointment, please contact your pharmacy and we will refill your medication until the date of your appointment.    Thank you for choosing [facility] for your health care needs.    Sincerely,    Refill RN  [facility]

## 2022-10-11 NOTE — TELEPHONE ENCOUNTER
"[facility] sent Rx request for the following:      Requested Prescriptions   Pending Prescriptions Disp Refills     glipiZIDE-metFORMIN (METAGLIP) 5-500 MG tablet [Pharmacy Med Name: GLIPIZIDE-METFORMIN 5MG/500MG TABS] 180 tablet 0     Sig: TAKE 1 TABLET BY MOUTH TWICE DAILY BEFORE MEALS       Combination Oral Antihyperglycemic Agents Failed - 10/8/2022  5:21 AM        Failed - Patient has documented A1c within the specified period of time.     If HgbA1C is 8 or greater, it needs to be on file within the past 3 months.  If less than 8, must be on file within the past 6 months.     Recent Labs   Lab Test 05/13/21  1549   A1C 8.9*             Failed - Patient's CR is NOT>1.4 OR Patient's EGFR is NOT<45 within past 12 mos.     Recent Labs   Lab Test 05/13/21  1549   GFRESTIMATED 72   GFRESTBLACK 87       Recent Labs   Lab Test 05/13/21  1549   CR 0.85             Failed - Recent (6 mo) or future (30 days) visit within the authorizing provider's specialty     Patient had office visit in the last 6 months or has a visit in the next 30 days with authorizing provider or within the authorizing provider's specialty.  See \"Patient Info\" tab in inbasket, or \"Choose Columns\" in Meds & Orders section of the refill encounter.            Passed - Patient does not have a diagnosis of CHF.        Passed - Medication is active on med list        Passed - Patient is 18 years old or older.        Passed - Patient is not pregnant        Passed - Patient has not had a positive pregnancy test within the past 12 mos.             Last Prescription Date:   07/21/2022  Last Fill Qty/Refills:         180, R-0    Last Office Visit:              05/13/2021  Future Office visit:           None    Patient is due for an annual wellness exam. Outreach please contact. Letter sent.    Marzena Montenegro RN  ....................  10/11/2022   10:06 AM            "

## 2022-10-12 RX ORDER — GLIPIZIDE AND METFORMIN HCL 5; 500 MG/1; MG/1
TABLET, FILM COATED ORAL
Qty: 180 TABLET | Refills: 0 | Status: SHIPPED | OUTPATIENT
Start: 2022-10-12 | End: 2023-01-11

## 2022-12-29 DIAGNOSIS — F31.9 BIPOLAR DEPRESSION (H): ICD-10-CM

## 2022-12-29 RX ORDER — FLUOXETINE 40 MG/1
CAPSULE ORAL
Qty: 90 CAPSULE | Refills: 0 | Status: SHIPPED | OUTPATIENT
Start: 2022-12-29 | End: 2023-04-13

## 2022-12-29 NOTE — TELEPHONE ENCOUNTER
[facility] sent Rx request for the following:      Requested Prescriptions   Pending Prescriptions Disp Refills     FLUoxetine (PROZAC) 40 MG capsule [Pharmacy Med Name: FLUOXETINE 40MG CAPSULES] 90 capsule 3     Sig: TAKE 1 CAPSULE(40 MG) BY MOUTH DAILY       SSRIs Protocol Failed - 12/29/2022  1:23 PM        Failed - Recent (12 mo) or future (30 days) visit within the authorizing provider's specialty     Last Prescription Date:   12/17/21  Last Fill Qty/Refills:         90, R-3    Last Office Visit:              5/13/21 (Dr. Celis)   Future Office visit:           None    Pt due for establish care/annual exam. Routing to provider for refill consideration. Routing to  OUTREACH APPT REQUESTS pool, to assist Pt in scheduling appointment.     In clinical absence of patient's primary, Roxanne Celis, patient is requesting that this message be sent to the covering provider for consideration please.    Wendy Evans RN .............. 12/29/2022  1:24 PM

## 2023-01-06 NOTE — TELEPHONE ENCOUNTER
Call attempted to schedule appointment. Hang up. Not able to leave message.    Pt is due for annual exam.    Miladys Muniz on 1/6/2023 at 12:53 PM

## 2023-01-09 DIAGNOSIS — E11.65 TYPE 2 DIABETES MELLITUS WITH HYPERGLYCEMIA, UNSPECIFIED WHETHER LONG TERM INSULIN USE (H): ICD-10-CM

## 2023-01-09 NOTE — LETTER
January 11, 2023      Beatriz Leal  [address] [address]      This letter is to remind you that you are due for your annual exam. Your last comprehensive visit was more than 12 months ago.    Additional refills require you to complete this appointment.    Please call the clinic at [phone] to schedule your appointment.    If you should require additional refills before your scheduled appointment, please contact your pharmacy and we will refill your medication until the date of your appointment.    If you are no longer wish to be seen at [facility] for primary care, please let us know. Doing so will remove you from our call/contact list.      Thank you for choosing [facility] and [facility] for your health care needs.    Sincerely,    Refill RN  [facility]

## 2023-01-10 NOTE — TELEPHONE ENCOUNTER
Talked to patient. She will call back to schedule annual exam when she is able.    Miladys Muniz on 1/10/2023 at 3:20 PM

## 2023-01-11 RX ORDER — GLIPIZIDE AND METFORMIN HCL 5; 500 MG/1; MG/1
TABLET, FILM COATED ORAL
Qty: 60 TABLET | Refills: 0 | Status: SHIPPED | OUTPATIENT
Start: 2023-01-11

## 2023-01-11 NOTE — TELEPHONE ENCOUNTER
[facility] sent Rx request for the following:      GLIPIZIDE-METFORMIN 5MG/500MG TABS    Last Prescription Date:   10/12/22  Last Fill Qty/Refills:         180, R-0    Last Office Visit:              5/13/21   Future Office visit:           None    Patient overdue for annual visit and establishing new PCP. Reminder letter sent.     Routing for consideration.     Louise Glaser RN .............. 1/11/2023  11:00 AM

## 2023-01-12 NOTE — TELEPHONE ENCOUNTER
LMTCB to schedule appointment.    Pt is due for annual exam/est care.    Miladys Muniz on 1/12/2023 at 1:33 PM

## 2023-01-13 NOTE — TELEPHONE ENCOUNTER
LMTCB x 2 to schedule appointment.     Pt is due for annual exam/est care.    Miladys Muniz on 1/13/2023 at 1:20 PM

## 2023-01-16 NOTE — TELEPHONE ENCOUNTER
LMTCB x 3 to schedule appointment.    Pt is due for annual exam/est care. Please send letter.    Miladys Muniz on 1/16/2023 at 11:51 AM

## 2023-04-11 DIAGNOSIS — F31.9 BIPOLAR DEPRESSION (H): ICD-10-CM

## 2023-04-13 RX ORDER — FLUOXETINE 40 MG/1
CAPSULE ORAL
Qty: 90 CAPSULE | Refills: 0 | Status: SHIPPED | OUTPATIENT
Start: 2023-04-13

## 2023-04-13 NOTE — TELEPHONE ENCOUNTER
[facility] sent Rx request for the following:      Requested Prescriptions   Pending Prescriptions Disp Refills     FLUoxetine (PROZAC) 40 MG capsule [Pharmacy Med Name: FLUOXETINE 40MG CAPSULES] 90 capsule 0     Sig: TAKE 1 CAPSULE(40 MG) BY MOUTH DAILY       SSRIs Protocol Failed - 4/13/2023  3:20 PM        Failed - Recent (12 mo) or future (30 days) visit within the authorizing provider's specialty     Last Prescription Date:   12/29/22  Last Fill Qty/Refills:         90, R-0    Last Office Visit:              5/13/21   Future Office visit:           None    Pt due for annual exam. Routing to provider for refill consideration. Routing to  OUTREACH APPT REQUESTS pool, to assist Pt in scheduling appointment.     Wendy Evans RN .............. 4/13/2023  3:25 PM

## 2024-03-23 NOTE — TELEPHONE ENCOUNTER
"Refer to triage encounter dated 09/28/2020. Norma Emery RN on 9/29/2020 at 12:54 PM      Additional Information    Negative: Systolic BP >= 160 OR Diastolic >= 100, and any cardiac or neurologic symptoms (e.g., chest pain, difficulty breathing, unsteady gait, blurred vision)    Ran out of BP medications    Negative: Sounds like a life-threatening emergency to the triager    Negative: Pregnant > 20 weeks or postpartum (< 6 weeks after delivery) and new hand or face swelling    Negative: Pregnant > 20 weeks and BP > 140/90    Negative: Patient sounds very sick or weak to the triager    BP Systolic BP >= 140 OR Diastolic >= 90 and postpartum (from 0 to 6 weeks after delivery)    Answer Assessment - Initial Assessment Questions  1. BLOOD PRESSURE: \"What is the blood pressure?\" \"Did you take at least two measurements 5 minutes apart?\"        2. ONSET: \"When did you take your blood pressure?\"      *No Answer*  3. HOW: \"How did you obtain the blood pressure?\" (e.g., visiting nurse, automatic home BP monitor)      *No Answer*  4. HISTORY: \"Do you have a history of high blood pressure?\"      *No Answer*  5. MEDICATIONS: \"Are you taking any medications for blood pressure?\" \"Have you missed any doses recently?\"      *No Answer*  6. OTHER SYMPTOMS: \"Do you have any symptoms?\" (e.g., headache, chest pain, blurred vision, difficulty breathing, weakness)      *No Answer*  7. PREGNANCY: \"Is there any chance you are pregnant?\" \"When was your last menstrual period?\"      *No Answer*    Protocols used: HIGH BLOOD PRESSURE-A-OH      "
ambulatory

## (undated) RX ORDER — CEFTRIAXONE SODIUM 1 G/50ML
INJECTION, SOLUTION INTRAVENOUS
Status: DISPENSED
Start: 2019-03-31